# Patient Record
Sex: FEMALE | Race: BLACK OR AFRICAN AMERICAN | Employment: OTHER | URBAN - METROPOLITAN AREA
[De-identification: names, ages, dates, MRNs, and addresses within clinical notes are randomized per-mention and may not be internally consistent; named-entity substitution may affect disease eponyms.]

---

## 2017-06-01 ENCOUNTER — APPOINTMENT (OUTPATIENT)
Dept: GENERAL RADIOLOGY | Facility: HOSPITAL | Age: 47
End: 2017-06-01
Attending: EMERGENCY MEDICINE
Payer: MEDICAID

## 2017-06-01 ENCOUNTER — HOSPITAL ENCOUNTER (EMERGENCY)
Facility: HOSPITAL | Age: 47
Discharge: HOME | End: 2017-06-01
Attending: EMERGENCY MEDICINE
Payer: MEDICAID

## 2017-06-01 VITALS
HEIGHT: 65 IN | OXYGEN SATURATION: 97 % | HEART RATE: 74 BPM | DIASTOLIC BLOOD PRESSURE: 76 MMHG | BODY MASS INDEX: 29.66 KG/M2 | WEIGHT: 178 LBS | RESPIRATION RATE: 14 BRPM | SYSTOLIC BLOOD PRESSURE: 112 MMHG

## 2017-06-01 DIAGNOSIS — S33.5XXA LOW BACK SPRAIN, INITIAL ENCOUNTER: Primary | ICD-10-CM

## 2017-06-01 PROCEDURE — 72110 X-RAY EXAM L-2 SPINE 4/>VWS: CPT | Performed by: EMERGENCY MEDICINE

## 2017-06-01 PROCEDURE — 96372 THER/PROPH/DIAG INJ SC/IM: CPT

## 2017-06-01 PROCEDURE — 99283 EMERGENCY DEPT VISIT LOW MDM: CPT

## 2017-06-01 PROCEDURE — 72072 X-RAY EXAM THORAC SPINE 3VWS: CPT | Performed by: EMERGENCY MEDICINE

## 2017-06-01 RX ORDER — KETOROLAC TROMETHAMINE 30 MG/ML
60 INJECTION, SOLUTION INTRAMUSCULAR; INTRAVENOUS ONCE
Status: COMPLETED | OUTPATIENT
Start: 2017-06-01 | End: 2017-06-01

## 2017-06-01 RX ORDER — CYCLOBENZAPRINE HCL 10 MG
10 TABLET ORAL 3 TIMES DAILY PRN
Qty: 20 TABLET | Refills: 0 | Status: SHIPPED | OUTPATIENT
Start: 2017-06-01 | End: 2017-06-08

## 2017-06-01 NOTE — ED INITIAL ASSESSMENT (HPI)
Pt c/o lower back pain and bilateral shoulder blades, pt sts she slipped on stairs fell to buttock and twisted to L side, pt denies LOC or numbness/tingling to arms/legs.

## 2017-06-01 NOTE — ED PROVIDER NOTES
Patient Seen in: [facility] Emergency Department    History   Patient presents with:  Trauma (cardiovascular, musculoskeletal)    Stated Complaint: slipped and fell down stairs injuried back    HPI    41-year-old woman presents status post fall down 05/03/2017 (Approximate)        Physical Exam   Constitutional: She is oriented to person, place, and time. She appears well-developed and well-nourished. Non-toxic appearance. No distress. HENT:   Head: Normocephalic and atraumatic.    Eyes: Conjunctiva (fge=[id number])    6/1/2017  PROCEDURE:  XR ROUTINE THORACIC SPINE (3 VIEWS) (CPT=72072)  TECHNIQUE:  AP, lateral, and swimmer's views of the thoracic spine were obtained. COMPARISON:  None.   INDICATIONS:  slipped and fell down stairs injuried back  PATIENT S Patient felt comfortable going home.         Disposition and Plan     Clinical Impression:  Low back sprain, initial encounter  (primary encounter diagnosis)    Disposition:  Discharge    Follow-up:  Nonstaff, Physician  [id number]

## 2018-07-09 ENCOUNTER — APPOINTMENT (OUTPATIENT)
Dept: ULTRASOUND IMAGING | Facility: HOSPITAL | Age: 48
End: 2018-07-09
Attending: EMERGENCY MEDICINE
Payer: MEDICAID

## 2018-07-09 ENCOUNTER — HOSPITAL ENCOUNTER (EMERGENCY)
Facility: HOSPITAL | Age: 48
Discharge: HOME | End: 2018-07-09
Attending: EMERGENCY MEDICINE
Payer: MEDICAID

## 2018-07-09 VITALS
WEIGHT: 175 LBS | BODY MASS INDEX: 29.16 KG/M2 | HEART RATE: 62 BPM | SYSTOLIC BLOOD PRESSURE: 99 MMHG | DIASTOLIC BLOOD PRESSURE: 67 MMHG | RESPIRATION RATE: 16 BRPM | OXYGEN SATURATION: 98 % | HEIGHT: 65 IN | TEMPERATURE: 98 F

## 2018-07-09 DIAGNOSIS — R60.9 PERIPHERAL EDEMA: Primary | ICD-10-CM

## 2018-07-09 PROCEDURE — 99284 EMERGENCY DEPT VISIT MOD MDM: CPT

## 2018-07-09 PROCEDURE — 93970 EXTREMITY STUDY: CPT | Performed by: EMERGENCY MEDICINE

## 2018-07-10 NOTE — ED PROVIDER NOTES
Patient Seen in: [facility] Emergency Department    History   Patient presents with:  Swelling Edema (cardiovascular, metabolic)    Stated Complaint: bilateral ankle swelling    HPI    Patient is a 39-year-old woman here with bilateral lower extremi She exhibits no tenderness. 1+ pitting ankle edema bilateral.   Neurological: She is alert and oriented to person, place, and time. She exhibits normal muscle tone. Coordination normal.   Skin: Skin is warm and dry. No rash noted.    Psychiatric: She has Impression:  Peripheral edema  (primary encounter diagnosis)    Disposition:  Discharge  7/9/2018 11:05 pm    Follow-up:  Jean Pierre Perla MD  [address] [place] [address] [phone]    In 2 days  Return to the ER if you feel worse

## 2018-07-30 ENCOUNTER — OFFICE VISIT (OUTPATIENT)
Dept: FAMILY MEDICINE CLINIC | Facility: CLINIC | Age: 48
End: 2018-07-30
Payer: MEDICAID

## 2018-07-30 VITALS
SYSTOLIC BLOOD PRESSURE: 110 MMHG | RESPIRATION RATE: 18 BRPM | DIASTOLIC BLOOD PRESSURE: 72 MMHG | BODY MASS INDEX: 33.01 KG/M2 | OXYGEN SATURATION: 98 % | HEIGHT: 63.75 IN | WEIGHT: 191 LBS | HEART RATE: 72 BPM

## 2018-07-30 DIAGNOSIS — R60.0 BILATERAL LEG EDEMA: Primary | ICD-10-CM

## 2018-07-30 DIAGNOSIS — Z00.00 ROUTINE GENERAL MEDICAL EXAMINATION AT A HEALTH CARE FACILITY: ICD-10-CM

## 2018-07-30 DIAGNOSIS — R20.0 ANTERIOR THIGH NUMBNESS: ICD-10-CM

## 2018-07-30 DIAGNOSIS — E66.9 CLASS 1 OBESITY WITHOUT SERIOUS COMORBIDITY WITH BODY MASS INDEX (BMI) OF 33.0 TO 33.9 IN ADULT, UNSPECIFIED OBESITY TYPE: ICD-10-CM

## 2018-07-30 PROBLEM — K21.9 GASTROESOPHAGEAL REFLUX DISEASE WITHOUT ESOPHAGITIS: Status: ACTIVE | Noted: 2018-07-30

## 2018-07-30 PROBLEM — E66.811 CLASS 1 OBESITY WITHOUT SERIOUS COMORBIDITY WITH BODY MASS INDEX (BMI) OF 33.0 TO 33.9 IN ADULT: Status: ACTIVE | Noted: 2018-07-30

## 2018-07-30 PROCEDURE — 99203 OFFICE O/P NEW LOW 30 MIN: CPT | Performed by: FAMILY MEDICINE

## 2018-07-30 RX ORDER — RANITIDINE 150 MG/1
1 CAPSULE ORAL DAILY
COMMUNITY
Start: 2016-08-23 | End: 2018-08-22

## 2018-07-30 NOTE — PROGRESS NOTES
HPI: 49year old female presents for 3 week ER f/u on B/L leg swelling (NP). VSS. Leg US's were negative B/L. Leg elevation has been helping since ER visit. Denies leg pain or injury. Denies CP/SOB/cough/dizziness. Denies urinary s/s. Denies sleep apnea.  Silvestre Meth rebound/rigidity. Musc: No joint erythema or tenderness. Normal muscular development. Normal gait. Extremities: Extremities normal, atraumatic, no cyanosis or edema. SLR neg B/L. DP's 2+ B/L  Neuro: Normal strength, sensation and reflexes of B/L LE's.   Nikki Lax

## 2018-07-31 NOTE — PATIENT INSTRUCTIONS
PLAN:  -labs/urine testing  -ECHO  -weight loss encouraged  -low salt diet  -compression stockings if needed    -loose fitting clothing to L thigh  -weight loss as discussed  -OTC Ibuprofen/Tylenol as needed  -consider EMG vs L-spine imaging if persists/wo

## 2018-08-01 ENCOUNTER — LAB ENCOUNTER (OUTPATIENT)
Dept: LAB | Facility: HOSPITAL | Age: 48
End: 2018-08-01
Attending: FAMILY MEDICINE
Payer: MEDICAID

## 2018-08-01 DIAGNOSIS — Z00.00 ROUTINE GENERAL MEDICAL EXAMINATION AT A HEALTH CARE FACILITY: ICD-10-CM

## 2018-08-01 PROBLEM — D64.9 ANEMIA: Status: ACTIVE | Noted: 2018-08-01

## 2018-08-01 PROBLEM — D50.9 IRON DEFICIENCY ANEMIA: Status: ACTIVE | Noted: 2018-08-01

## 2018-08-01 PROBLEM — D64.9 ANEMIA: Status: RESOLVED | Noted: 2018-08-01 | Resolved: 2018-08-01

## 2018-08-01 LAB
ALBUMIN SERPL-MCNC: 3.6 G/DL (ref 3.5–4.8)
ALBUMIN/GLOB SERPL: 1 {RATIO} (ref 1–2)
ALP LIVER SERPL-CCNC: 62 U/L (ref 39–100)
ALT SERPL-CCNC: 13 U/L (ref 14–54)
ANION GAP SERPL CALC-SCNC: 9 MMOL/L (ref 0–18)
AST SERPL-CCNC: 11 U/L (ref 15–41)
BASOPHILS # BLD AUTO: 0.02 X10(3) UL (ref 0–0.1)
BASOPHILS NFR BLD AUTO: 0.5 %
BILIRUB SERPL-MCNC: 0.9 MG/DL (ref 0.1–2)
BILIRUB UR QL STRIP.AUTO: NEGATIVE
BUN BLD-MCNC: 12 MG/DL (ref 8–20)
BUN/CREAT SERPL: 12.8 (ref 10–20)
CALCIUM BLD-MCNC: 8.7 MG/DL (ref 8.3–10.3)
CHLORIDE SERPL-SCNC: 107 MMOL/L (ref 101–111)
CHOLEST SMN-MCNC: 190 MG/DL (ref ?–200)
CO2 SERPL-SCNC: 24 MMOL/L (ref 22–32)
COLOR UR AUTO: YELLOW
CREAT BLD-MCNC: 0.94 MG/DL (ref 0.55–1.02)
EOSINOPHIL # BLD AUTO: 0.07 X10(3) UL (ref 0–0.3)
EOSINOPHIL NFR BLD AUTO: 1.7 %
ERYTHROCYTE [DISTWIDTH] IN BLOOD BY AUTOMATED COUNT: 14.6 % (ref 11.5–16)
GLOBULIN PLAS-MCNC: 3.7 G/DL (ref 2.5–3.7)
GLUCOSE BLD-MCNC: 85 MG/DL (ref 70–99)
GLUCOSE UR STRIP.AUTO-MCNC: NEGATIVE MG/DL
HCT VFR BLD AUTO: 36.7 % (ref 34–50)
HDLC SERPL-MCNC: 77 MG/DL (ref 40–59)
HGB BLD-MCNC: 11.4 G/DL (ref 12–16)
IMMATURE GRANULOCYTE COUNT: 0 X10(3) UL (ref 0–1)
IMMATURE GRANULOCYTE RATIO %: 0 %
KETONES UR STRIP.AUTO-MCNC: NEGATIVE MG/DL
LDLC SERPL CALC-MCNC: 102 MG/DL (ref ?–100)
LEUKOCYTE ESTERASE UR QL STRIP.AUTO: NEGATIVE
LYMPHOCYTES # BLD AUTO: 1.75 X10(3) UL (ref 0.9–4)
LYMPHOCYTES NFR BLD AUTO: 43 %
M PROTEIN MFR SERPL ELPH: 7.3 G/DL (ref 6.1–8.3)
MCH RBC QN AUTO: 24.7 PG (ref 27–33.2)
MCHC RBC AUTO-ENTMCNC: 31.1 G/DL (ref 31–37)
MCV RBC AUTO: 79.6 FL (ref 81–100)
MONOCYTES # BLD AUTO: 0.39 X10(3) UL (ref 0.1–1)
MONOCYTES NFR BLD AUTO: 9.6 %
NEUTROPHIL ABS PRELIM: 1.84 X10 (3) UL (ref 1.3–6.7)
NEUTROPHILS # BLD AUTO: 1.84 X10(3) UL (ref 1.3–6.7)
NEUTROPHILS NFR BLD AUTO: 45.2 %
NITRITE UR QL STRIP.AUTO: NEGATIVE
NONHDLC SERPL-MCNC: 113 MG/DL (ref ?–130)
OSMOLALITY SERPL CALC.SUM OF ELEC: 289 MOSM/KG (ref 275–295)
PH UR STRIP.AUTO: 5 [PH] (ref 4.5–8)
PLATELET # BLD AUTO: 249 10(3)UL (ref 150–450)
POTASSIUM SERPL-SCNC: 3.8 MMOL/L (ref 3.6–5.1)
PROT UR STRIP.AUTO-MCNC: NEGATIVE MG/DL
RBC # BLD AUTO: 4.61 X10(6)UL (ref 3.8–5.1)
RBC UR QL AUTO: NEGATIVE
RED CELL DISTRIBUTION WIDTH-SD: 42.1 FL (ref 35.1–46.3)
SODIUM SERPL-SCNC: 140 MMOL/L (ref 136–144)
SP GR UR STRIP.AUTO: 1.02 (ref 1–1.03)
TRIGL SERPL-MCNC: 57 MG/DL (ref 30–149)
TSI SER-ACNC: 1.52 MIU/ML (ref 0.35–5.5)
UROBILINOGEN UR STRIP.AUTO-MCNC: 2 MG/DL
VLDLC SERPL CALC-MCNC: 11 MG/DL (ref 0–30)
WBC # BLD AUTO: 4.1 X10(3) UL (ref 4–13)

## 2018-08-01 PROCEDURE — 81003 URINALYSIS AUTO W/O SCOPE: CPT

## 2018-08-01 PROCEDURE — 36415 COLL VENOUS BLD VENIPUNCTURE: CPT

## 2018-08-01 PROCEDURE — 80053 COMPREHEN METABOLIC PANEL: CPT

## 2018-08-01 PROCEDURE — 82728 ASSAY OF FERRITIN: CPT | Performed by: FAMILY MEDICINE

## 2018-08-01 PROCEDURE — 85025 COMPLETE CBC W/AUTO DIFF WBC: CPT

## 2018-08-01 PROCEDURE — 83550 IRON BINDING TEST: CPT | Performed by: FAMILY MEDICINE

## 2018-08-01 PROCEDURE — 83540 ASSAY OF IRON: CPT | Performed by: FAMILY MEDICINE

## 2018-08-01 PROCEDURE — 82607 VITAMIN B-12: CPT | Performed by: FAMILY MEDICINE

## 2018-08-01 PROCEDURE — 84443 ASSAY THYROID STIM HORMONE: CPT

## 2018-08-01 PROCEDURE — 80061 LIPID PANEL: CPT

## 2018-08-01 PROCEDURE — 82746 ASSAY OF FOLIC ACID SERUM: CPT | Performed by: FAMILY MEDICINE

## 2018-08-02 ENCOUNTER — TELEPHONE (OUTPATIENT)
Dept: FAMILY MEDICINE CLINIC | Facility: CLINIC | Age: 48
End: 2018-08-02

## 2018-08-02 NOTE — TELEPHONE ENCOUNTER
----- Message from Greg Naik DO sent at 8/1/2018  3:24 PM CDT -----  Nml CMP/TSH/UA/folic acid/vit N25    Lipids: Nml trig; great HDL chol!; LDL chol minimally high-rec HHD/DASH diet for LDL chol lowering. Monitor lipids annually.     CBC: mild ane

## 2018-08-02 NOTE — TELEPHONE ENCOUNTER
Spoke to pt regarding lab results; pt informed of results and recommendations; will begin Ferrous Sulfate 325mg daily and an iron rich supplement. Pt instructed to recheck CBC and Ferritin in 6 mo. Pt verbalizes understanding.

## 2018-08-02 NOTE — TELEPHONE ENCOUNTER
----- Message from Lien Virk DO sent at 8/1/2018  3:25 PM CDT -----  Nml CMP/TSH/UA/folic acid/vit Y79    Lipids: Nml trig; great HDL chol!; LDL chol minimally high-rec HHD/DASH diet for LDL chol lowering. Monitor lipids annually.     CBC: mild ane

## 2018-08-18 ENCOUNTER — HOSPITAL ENCOUNTER (OUTPATIENT)
Dept: CV DIAGNOSTICS | Facility: HOSPITAL | Age: 48
Discharge: HOME | End: 2018-08-18
Attending: FAMILY MEDICINE
Payer: MEDICAID

## 2018-08-18 DIAGNOSIS — R60.0 BILATERAL LEG EDEMA: ICD-10-CM

## 2018-08-18 PROCEDURE — 93306 TTE W/DOPPLER COMPLETE: CPT | Performed by: FAMILY MEDICINE

## 2018-08-21 ENCOUNTER — TELEPHONE (OUTPATIENT)
Dept: FAMILY MEDICINE CLINIC | Facility: CLINIC | Age: 48
End: 2018-08-21

## 2018-08-21 NOTE — TELEPHONE ENCOUNTER
----- Message from Toya Chung DO sent at 8/20/2018 12:42 PM CDT -----  ECHO: Mild mitral valve regurgitation otherwise nml study. Unlikely contributor to leg edema.      Continue conservative measures as discussed at last OV 07/30 and f/u in the off

## 2018-08-21 NOTE — TELEPHONE ENCOUNTER
Spoke to pt; informed her of results and recommendations per Dr. Lynda Barry; pt expressed concern with the \"mild mitral valve regurgitation\", pt reassured and asked if she would like to speak with Dr. Lynda Barry; pt states she has an appt with Dr. Lynda Barry o

## 2018-08-22 PROBLEM — F32.A DEPRESSION: Status: ACTIVE | Noted: 2018-08-22

## 2018-08-22 PROBLEM — F41.9 ANXIETY: Status: ACTIVE | Noted: 2018-08-22

## 2018-08-22 NOTE — PATIENT INSTRUCTIONS
PLAN:  -start Effexor 37.5mg daily x 1 week, then increase to 75mg daily  -Ambien nightly as needed    -I recommend Counseling (see below):   [facility] SPECIALTY [facility]  [address] [facility]  [phone]    [facility]

## 2018-08-22 NOTE — PROGRESS NOTES
HPI: 49year old female presents c/o anxiety, depression x several months, worsening the past 1.5 months. VSS. 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 (+) assoc restlessness, fatigue, irritability, and difficulty concentrating.  Google auscultation bilaterally. Heart: Regular rate and rhythm, S1, S2 normal, no murmur, click, rub, or gallop. Musc: No joint erythema or tenderness. Normal muscular development. Normal gait.   Extremities: Extremities normal, atraumatic, no cyanosis or marilynn

## 2018-08-23 ENCOUNTER — TELEPHONE (OUTPATIENT)
Dept: FAMILY MEDICINE CLINIC | Facility: CLINIC | Age: 48
End: 2018-08-23

## 2018-08-23 RX ORDER — VENLAFAXINE HYDROCHLORIDE 37.5 MG/1
37.5 CAPSULE, EXTENDED RELEASE ORAL DAILY
Qty: 7 CAPSULE | Refills: 0 | Status: SHIPPED | OUTPATIENT
Start: 2018-08-23 | End: 2018-08-30

## 2018-08-23 RX ORDER — VENLAFAXINE HYDROCHLORIDE 75 MG/1
75 CAPSULE, EXTENDED RELEASE ORAL DAILY
Qty: 30 CAPSULE | Refills: 0 | Status: SHIPPED | OUTPATIENT
Start: 2018-08-23 | End: 2019-04-01

## 2018-08-23 RX ORDER — VENLAFAXINE 75 MG/1
75 TABLET ORAL DAILY
Qty: 60 TABLET | Refills: 0 | Status: CANCELLED | OUTPATIENT
Start: 2018-08-23

## 2018-08-23 NOTE — TELEPHONE ENCOUNTER
Incoming fax from [employer] with notification of denial of coverage for rx Venlafaxine ER 37.5 mg. Insurance pays max 1 cap qd. Pharmacy requesting new rx for 75 mg caps 1 qd. Rx pended - please advise.

## 2019-04-01 ENCOUNTER — HOSPITAL ENCOUNTER (EMERGENCY)
Facility: HOSPITAL | Age: 49
Discharge: HOME | End: 2019-04-01
Payer: COMMERCIAL

## 2019-04-01 VITALS
SYSTOLIC BLOOD PRESSURE: 121 MMHG | WEIGHT: 170 LBS | BODY MASS INDEX: 28.32 KG/M2 | OXYGEN SATURATION: 98 % | TEMPERATURE: 98 F | HEIGHT: 65 IN | DIASTOLIC BLOOD PRESSURE: 76 MMHG | RESPIRATION RATE: 16 BRPM | HEART RATE: 69 BPM

## 2019-04-01 DIAGNOSIS — J04.0 ACUTE LARYNGITIS: Primary | ICD-10-CM

## 2019-04-01 PROCEDURE — 99283 EMERGENCY DEPT VISIT LOW MDM: CPT

## 2019-04-01 RX ORDER — CETIRIZINE HYDROCHLORIDE 10 MG/1
10 TABLET ORAL DAILY
Qty: 30 TABLET | Refills: 0 | Status: SHIPPED | OUTPATIENT
Start: 2019-04-01 | End: 2019-05-01

## 2019-04-01 NOTE — ED PROVIDER NOTES
Patient Seen in: [facility] Emergency Department    History   Patient presents with:  Sore Throat    Stated Complaint: hoarse voice    HPI    CHIEF COMPLAINT: Hoarse voice    HISTORY OF PRESENT ILLNESS: Patient is a 50-year-old female presents to  Physical Exam    Vital signs and nursing notes reviewed    General Appearance: alert and oriented x 4, no acute distress  Eyes:  pupils equal and round no pallor or injection  Throat: There is mild erythema w/o exudates, no tonsillar hypertrophy.  n pm    Follow-up:  Jones Gayle, DO  [address]  [place] [address]  [phone]    Schedule an appointment as soon as possible for a visit in 1 week  For reevaluation        Medications Prescribed:  Current Discharge Medication List

## 2019-04-01 NOTE — ED INITIAL ASSESSMENT (HPI)
Pt to ED with cough starting on Thursday and having hoarse voice since Saturday. Pt states her throat really isn't sore she is more concerned about the hoarse sound of her voice.

## 2019-05-23 ENCOUNTER — OFFICE VISIT (OUTPATIENT)
Dept: FAMILY MEDICINE CLINIC | Facility: CLINIC | Age: 49
End: 2019-05-23

## 2019-05-23 VITALS
BODY MASS INDEX: 29.82 KG/M2 | TEMPERATURE: 98 F | WEIGHT: 179 LBS | RESPIRATION RATE: 17 BRPM | DIASTOLIC BLOOD PRESSURE: 72 MMHG | HEIGHT: 65 IN | OXYGEN SATURATION: 98 % | SYSTOLIC BLOOD PRESSURE: 100 MMHG | HEART RATE: 62 BPM

## 2019-05-23 DIAGNOSIS — Z12.31 BREAST CANCER SCREENING BY MAMMOGRAM: ICD-10-CM

## 2019-05-23 DIAGNOSIS — Z72.51 HIGH RISK SEXUAL BEHAVIOR, UNSPECIFIED TYPE: ICD-10-CM

## 2019-05-23 DIAGNOSIS — R92.2 DENSE BREAST TISSUE: ICD-10-CM

## 2019-05-23 DIAGNOSIS — Z12.11 COLON CANCER SCREENING: ICD-10-CM

## 2019-05-23 DIAGNOSIS — N91.2 AMENORRHEA: ICD-10-CM

## 2019-05-23 DIAGNOSIS — D50.9 IRON DEFICIENCY ANEMIA, UNSPECIFIED IRON DEFICIENCY ANEMIA TYPE: ICD-10-CM

## 2019-05-23 DIAGNOSIS — Z00.00 ROUTINE GENERAL MEDICAL EXAMINATION AT A HEALTH CARE FACILITY: Primary | ICD-10-CM

## 2019-05-23 DIAGNOSIS — N63.0 BREAST MASS: ICD-10-CM

## 2019-05-23 DIAGNOSIS — Z12.4 CERVICAL CANCER SCREENING: ICD-10-CM

## 2019-05-23 DIAGNOSIS — G47.00 INSOMNIA, UNSPECIFIED TYPE: ICD-10-CM

## 2019-05-23 PROBLEM — R92.30 DENSE BREAST TISSUE: Status: ACTIVE | Noted: 2019-05-23

## 2019-05-23 PROCEDURE — 87624 HPV HI-RISK TYP POOLED RSLT: CPT | Performed by: FAMILY MEDICINE

## 2019-05-23 PROCEDURE — 81025 URINE PREGNANCY TEST: CPT | Performed by: FAMILY MEDICINE

## 2019-05-23 PROCEDURE — 87491 CHLMYD TRACH DNA AMP PROBE: CPT | Performed by: FAMILY MEDICINE

## 2019-05-23 PROCEDURE — 88175 CYTOPATH C/V AUTO FLUID REDO: CPT | Performed by: FAMILY MEDICINE

## 2019-05-23 PROCEDURE — 87591 N.GONORRHOEAE DNA AMP PROB: CPT | Performed by: FAMILY MEDICINE

## 2019-05-23 PROCEDURE — 99396 PREV VISIT EST AGE 40-64: CPT | Performed by: FAMILY MEDICINE

## 2019-05-23 NOTE — PROGRESS NOTES
HPI: 51year old female presents for a general physical & pap smear. VSS. Denies fevers/chills, C.P., palpitations, dizziness, SOB, cough, abd pain, N/V/D, fatigue. No recent illness. Nml urine without dysuria or hematuria. Nml BM's. Weight is stable.  LMP cooperative, no distress, appears stated age. Overweight  Psych: Nml affect. Nml ADLs. Able to manage affairs. Head: Normocephalic, without obvious abnormality, atraumatic. Eyes: Conjunctivae/corneas clear. PERRL, EOMs intact.    Ears: Normal TMs and exte Anterior thigh numbness     Iron deficiency anemia     Depression     Anxiety     Postpartum care and examination immediately after delivery     Migraine     Major depressive disorder, recurrent episode, moderate (HCC)     History of migraine during pregna

## 2019-05-23 NOTE — PATIENT INSTRUCTIONS
PLAN:  -urine pregnancy negative  -fasting bloodwork with STD testing  -pap smear obtained  -3D mammogram/R breast US ordered  -colonoscopy at age 47 (see GI referral)  -stay well-hydrated  -regular exercise: goal 5x/week of moderate-intensity exercise

## 2019-05-25 ENCOUNTER — LAB ENCOUNTER (OUTPATIENT)
Dept: LAB | Facility: HOSPITAL | Age: 49
End: 2019-05-25
Attending: FAMILY MEDICINE
Payer: COMMERCIAL

## 2019-05-25 DIAGNOSIS — D50.9 IRON DEFICIENCY ANEMIA, UNSPECIFIED IRON DEFICIENCY ANEMIA TYPE: ICD-10-CM

## 2019-05-25 DIAGNOSIS — N91.2 AMENORRHEA: ICD-10-CM

## 2019-05-25 DIAGNOSIS — Z00.00 ROUTINE GENERAL MEDICAL EXAMINATION AT A HEALTH CARE FACILITY: ICD-10-CM

## 2019-05-25 DIAGNOSIS — Z72.51 HIGH RISK SEXUAL BEHAVIOR, UNSPECIFIED TYPE: ICD-10-CM

## 2019-05-25 PROCEDURE — 84443 ASSAY THYROID STIM HORMONE: CPT

## 2019-05-25 PROCEDURE — 87389 HIV-1 AG W/HIV-1&-2 AB AG IA: CPT

## 2019-05-25 PROCEDURE — 82728 ASSAY OF FERRITIN: CPT

## 2019-05-25 PROCEDURE — 86780 TREPONEMA PALLIDUM: CPT

## 2019-05-25 PROCEDURE — 80053 COMPREHEN METABOLIC PANEL: CPT

## 2019-05-25 PROCEDURE — 83001 ASSAY OF GONADOTROPIN (FSH): CPT

## 2019-05-25 PROCEDURE — 82670 ASSAY OF TOTAL ESTRADIOL: CPT

## 2019-05-25 PROCEDURE — 87340 HEPATITIS B SURFACE AG IA: CPT

## 2019-05-25 PROCEDURE — 85025 COMPLETE CBC W/AUTO DIFF WBC: CPT

## 2019-05-25 PROCEDURE — 80061 LIPID PANEL: CPT

## 2019-05-25 PROCEDURE — 36415 COLL VENOUS BLD VENIPUNCTURE: CPT

## 2019-06-03 ENCOUNTER — TELEPHONE (OUTPATIENT)
Dept: FAMILY MEDICINE CLINIC | Facility: CLINIC | Age: 49
End: 2019-06-03

## 2019-06-03 DIAGNOSIS — E61.1 IRON DEFICIENCY: Primary | ICD-10-CM

## 2019-06-03 NOTE — TELEPHONE ENCOUNTER
----- Message from Tiffany Rowley DO sent at 5/31/2019  8:30 AM CDT -----  STD testing (HIV/RPR/Hep B) negative. Nml CBC/CMP/TSH. Hormones not in menopausal range as of yet. Likely perimenopausal.    Lipids: minimally elev LDL chol.  Great HDL ch

## 2019-06-03 NOTE — TELEPHONE ENCOUNTER
----- Message from Edel Hensley DO sent at 5/31/2019  8:33 AM CDT -----  Pap smear nml, HPV negative. Rec routine pap smear in 3 years. Was Romulo/Chl not run?  If not, ask patient if she would still like this run and we can have her give us a urine s

## 2019-06-03 NOTE — TELEPHONE ENCOUNTER
Pt returned call, informed her of lab results and recommendations per Dr. Omar Franklin. Pt informed will call with Romulo/Chl results when available. Pt states she is not taking OTC iron supplement.  Pt asking if labs indicate she is close to menopause or just sta

## 2019-06-03 NOTE — TELEPHONE ENCOUNTER
LMTCB  FYI Romulo/chl added; will be ran and resulted by [facility] lab.

## 2019-06-04 RX ORDER — FERROUS SULFATE 325(65) MG
325 TABLET ORAL
Qty: 90 TABLET | Refills: 3 | COMMUNITY
Start: 2019-06-04 | End: 2021-11-08

## 2019-06-04 NOTE — TELEPHONE ENCOUNTER
Romulo/Chl NEGATIVE    Labs unable to indicate exact timing of menopause onset. Rec OTC Ferrous Sulfate 325mg once daily with an iron-rich diet. Recheck CBC/ferritin in 3-6 months.

## 2019-06-06 NOTE — TELEPHONE ENCOUNTER
Spoke with patient, advised Romulo/Chl negative & unfortunately labs unable to indicate exact timing of menopause onset. Recommended otc iron Ferrous Sulfate 325 mg daily and iron rich diet & rpt blood test CBC in 3-6 mo.   Patient verbalizes understanding of

## 2019-12-28 ENCOUNTER — HOSPITAL ENCOUNTER (OUTPATIENT)
Dept: MAMMOGRAPHY | Facility: HOSPITAL | Age: 49
Discharge: HOME | End: 2019-12-28
Attending: FAMILY MEDICINE
Payer: COMMERCIAL

## 2019-12-28 DIAGNOSIS — Z12.31 BREAST CANCER SCREENING BY MAMMOGRAM: ICD-10-CM

## 2019-12-28 DIAGNOSIS — R92.2 DENSE BREAST TISSUE: ICD-10-CM

## 2019-12-28 PROCEDURE — 77063 BREAST TOMOSYNTHESIS BI: CPT | Performed by: FAMILY MEDICINE

## 2019-12-28 PROCEDURE — 77067 SCR MAMMO BI INCL CAD: CPT | Performed by: FAMILY MEDICINE

## 2020-04-04 ENCOUNTER — HOSPITAL ENCOUNTER (EMERGENCY)
Facility: HOSPITAL | Age: 50
Discharge: HOME | End: 2020-04-04
Attending: EMERGENCY MEDICINE
Payer: COMMERCIAL

## 2020-04-04 ENCOUNTER — APPOINTMENT (OUTPATIENT)
Dept: GENERAL RADIOLOGY | Facility: HOSPITAL | Age: 50
End: 2020-04-04
Attending: EMERGENCY MEDICINE
Payer: COMMERCIAL

## 2020-04-04 VITALS
WEIGHT: 180 LBS | BODY MASS INDEX: 29.99 KG/M2 | HEART RATE: 64 BPM | HEIGHT: 65 IN | TEMPERATURE: 99 F | DIASTOLIC BLOOD PRESSURE: 83 MMHG | RESPIRATION RATE: 15 BRPM | OXYGEN SATURATION: 97 % | SYSTOLIC BLOOD PRESSURE: 121 MMHG

## 2020-04-04 DIAGNOSIS — Z20.822 SUSPECTED WUHAN CORONAVIRUS INFECTION: Primary | ICD-10-CM

## 2020-04-04 PROCEDURE — 71045 X-RAY EXAM CHEST 1 VIEW: CPT | Performed by: EMERGENCY MEDICINE

## 2020-04-04 PROCEDURE — 93010 ELECTROCARDIOGRAM REPORT: CPT

## 2020-04-04 PROCEDURE — 99285 EMERGENCY DEPT VISIT HI MDM: CPT

## 2020-04-04 PROCEDURE — 99284 EMERGENCY DEPT VISIT MOD MDM: CPT

## 2020-04-04 PROCEDURE — 93005 ELECTROCARDIOGRAM TRACING: CPT

## 2020-04-04 PROCEDURE — 87999 UNLISTED MICROBIOLOGY PX: CPT

## 2020-04-04 NOTE — ED NOTES
Pt aox4. Rn discussed dc, quarantine, and return to ed with worsening s/s such as OLIVIA with exertion with patient. Pt verbalized understanding of dc instructions. Pt ambulated to ed exit with steady gait.

## 2020-04-04 NOTE — ED PROVIDER NOTES
Patient Seen in: [facility] Emergency Department      History   Patient presents with:  Dyspnea OLIVIA SOB  Headache    Stated Complaint: shortness of breath, chest tightness, sinus headache, cough    HPI    Patient presents with vague chest heaviness respiratory distress. Breath sounds: Normal breath sounds. No decreased breath sounds or wheezing. Abdominal:      General: Bowel sounds are normal.      Palpations: Abdomen is soft. Tenderness: There is no tenderness. There is no rebound.    Mu

## 2020-04-04 NOTE — ED INITIAL ASSESSMENT (HPI)
Pt aox4. Pt presents to ed from home. Pt c/o headache around eyes, night sweats, and chest heaviness x 2 days. Pt states has not exposed to COVID. Pt states has had night sweats due to menopause.  Pt c/o dry cough that is intermittent but polo for the george

## 2021-03-30 ENCOUNTER — HOSPITAL ENCOUNTER (EMERGENCY)
Facility: HOSPITAL | Age: 51
Discharge: HOME | End: 2021-03-30
Attending: EMERGENCY MEDICINE
Payer: COMMERCIAL

## 2021-03-30 VITALS
SYSTOLIC BLOOD PRESSURE: 132 MMHG | WEIGHT: 179.88 LBS | TEMPERATURE: 98 F | OXYGEN SATURATION: 99 % | DIASTOLIC BLOOD PRESSURE: 97 MMHG | HEART RATE: 78 BPM | RESPIRATION RATE: 16 BRPM | BODY MASS INDEX: 30 KG/M2

## 2021-03-30 DIAGNOSIS — H92.01 RIGHT EAR PAIN: Primary | ICD-10-CM

## 2021-03-30 PROCEDURE — 99283 EMERGENCY DEPT VISIT LOW MDM: CPT | Performed by: EMERGENCY MEDICINE

## 2021-03-30 PROCEDURE — 87081 CULTURE SCREEN ONLY: CPT | Performed by: EMERGENCY MEDICINE

## 2021-03-30 PROCEDURE — 87430 STREP A AG IA: CPT | Performed by: EMERGENCY MEDICINE

## 2021-03-30 NOTE — ED INITIAL ASSESSMENT (HPI)
Pt c/o rt ear, denies any drainage or trauma, pt feels that she is getting a ear ache, pt aox4, nad skin warm and intact

## 2021-03-30 NOTE — ED PROVIDER NOTES
Patient Seen in: [facility] Emergency Department      History   Patient presents with:  Ear Problem Pain    Stated Complaint: Ear pain, right ear.  Denies fever    HPI/Subjective:   HPI    This is a 69-year-old female who presents with right ear pain no perforation there is no foreign body there is findings of what seems to be some mild dullness to the right TMs. Submandibular there is some mild nonspecific small lymph nodes. The throat is mildly red there is no peritonsillar abscess. No stridor.   L diagnosis)    Disposition:  Discharge  3/30/2021  7:19 pm    Follow-up:  Felicity Vargas MD  [address] [phone]    In 2 days            Medications Prescribed:  Current Discharge Medication List

## 2021-05-03 ENCOUNTER — HOSPITAL ENCOUNTER (OUTPATIENT)
Age: 51
Discharge: HOME | End: 2021-05-03
Payer: COMMERCIAL

## 2021-05-03 VITALS
HEART RATE: 78 BPM | BODY MASS INDEX: 32.32 KG/M2 | WEIGHT: 194 LBS | SYSTOLIC BLOOD PRESSURE: 106 MMHG | RESPIRATION RATE: 18 BRPM | DIASTOLIC BLOOD PRESSURE: 75 MMHG | OXYGEN SATURATION: 97 % | TEMPERATURE: 99 F | HEIGHT: 65 IN

## 2021-05-03 DIAGNOSIS — B35.4 TINEA CORPORIS: Primary | ICD-10-CM

## 2021-05-03 PROCEDURE — 99202 OFFICE O/P NEW SF 15 MIN: CPT | Performed by: PHYSICIAN ASSISTANT

## 2021-05-03 NOTE — ED PROVIDER NOTES
Patient Seen in: [facility] [address] [place] [address] Highway      History   Patient presents with:  Pain  Swollen Glands  Rash Skin Problem    Stated Complaint: pain in left chin area , rash on hip     HPI/Subjective:   HPI    CHIEF COMPLAINT: Rash on the left thigh Temp 99.1 °F (37.3 °C)   Temp src Temporal   SpO2 97 %   O2 Device None (Room air)       Current:/75   Pulse 78   Temp 99.1 °F (37.3 °C) (Temporal)   Resp 18   Ht 165.1 cm (5' 5\")   Wt 88 kg   LMP 11/03/2020   SpO2 97%   BMI 32.28 kg/m²         Ph pm    Follow-up:  Sherman Huntley MD  [address] [phone]    In 1 week  If symptoms worsen          Medications Prescribed:  Discharge Medication List as of 5/3/2021  7:08 PM

## 2021-07-25 ENCOUNTER — HOSPITAL ENCOUNTER (EMERGENCY)
Facility: HOSPITAL | Age: 51
Discharge: HOME | End: 2021-07-25
Attending: EMERGENCY MEDICINE
Payer: COMMERCIAL

## 2021-07-25 ENCOUNTER — APPOINTMENT (OUTPATIENT)
Dept: GENERAL RADIOLOGY | Facility: HOSPITAL | Age: 51
End: 2021-07-25
Attending: EMERGENCY MEDICINE
Payer: COMMERCIAL

## 2021-07-25 VITALS
RESPIRATION RATE: 14 BRPM | BODY MASS INDEX: 29.99 KG/M2 | HEIGHT: 65 IN | TEMPERATURE: 98 F | SYSTOLIC BLOOD PRESSURE: 109 MMHG | HEART RATE: 61 BPM | DIASTOLIC BLOOD PRESSURE: 71 MMHG | OXYGEN SATURATION: 100 % | WEIGHT: 180 LBS

## 2021-07-25 DIAGNOSIS — R07.9 ACUTE CHEST PAIN: Primary | ICD-10-CM

## 2021-07-25 LAB
ALBUMIN SERPL-MCNC: 3.1 G/DL (ref 3.4–5)
ALBUMIN/GLOB SERPL: 0.9 {RATIO} (ref 1–2)
ALP LIVER SERPL-CCNC: 58 U/L
ALT SERPL-CCNC: 10 U/L
ANION GAP SERPL CALC-SCNC: 5 MMOL/L (ref 0–18)
AST SERPL-CCNC: 9 U/L (ref 15–37)
ATRIAL RATE: 54 BPM
ATRIAL RATE: 64 BPM
BASOPHILS # BLD AUTO: 0.02 X10(3) UL (ref 0–0.2)
BASOPHILS NFR BLD AUTO: 0.5 %
BILIRUB SERPL-MCNC: 0.7 MG/DL (ref 0.1–2)
BUN BLD-MCNC: 6 MG/DL (ref 7–18)
BUN/CREAT SERPL: 5.7 (ref 10–20)
CALCIUM BLD-MCNC: 8 MG/DL (ref 8.5–10.1)
CHLORIDE SERPL-SCNC: 107 MMOL/L (ref 98–112)
CO2 SERPL-SCNC: 26 MMOL/L (ref 21–32)
CREAT BLD-MCNC: 1.06 MG/DL
D-DIMER: <0.27 UG/ML FEU (ref ?–0.51)
DEPRECATED RDW RBC AUTO: 43.9 FL (ref 35.1–46.3)
EOSINOPHIL # BLD AUTO: 0.1 X10(3) UL (ref 0–0.7)
EOSINOPHIL NFR BLD AUTO: 2.5 %
ERYTHROCYTE [DISTWIDTH] IN BLOOD BY AUTOMATED COUNT: 15 % (ref 11–15)
GLOBULIN PLAS-MCNC: 3.4 G/DL (ref 2.8–4.4)
GLUCOSE BLD-MCNC: 86 MG/DL (ref 70–99)
HCT VFR BLD AUTO: 36.8 %
HGB BLD-MCNC: 11.7 G/DL
IMM GRANULOCYTES # BLD AUTO: 0.01 X10(3) UL (ref 0–1)
IMM GRANULOCYTES NFR BLD: 0.2 %
LYMPHOCYTES # BLD AUTO: 1.46 X10(3) UL (ref 1–4)
LYMPHOCYTES NFR BLD AUTO: 35.8 %
M PROTEIN MFR SERPL ELPH: 6.5 G/DL (ref 6.4–8.2)
MCH RBC QN AUTO: 25.7 PG (ref 26–34)
MCHC RBC AUTO-ENTMCNC: 31.8 G/DL (ref 31–37)
MCV RBC AUTO: 80.7 FL
MONOCYTES # BLD AUTO: 0.34 X10(3) UL (ref 0.1–1)
MONOCYTES NFR BLD AUTO: 8.3 %
NEUTROPHILS # BLD AUTO: 2.15 X10 (3) UL (ref 1.5–7.7)
NEUTROPHILS # BLD AUTO: 2.15 X10(3) UL (ref 1.5–7.7)
NEUTROPHILS NFR BLD AUTO: 52.7 %
OSMOLALITY SERPL CALC.SUM OF ELEC: 283 MOSM/KG (ref 275–295)
P AXIS: 43 DEGREES
P AXIS: 47 DEGREES
P-R INTERVAL: 166 MS
P-R INTERVAL: 168 MS
PLATELET # BLD AUTO: 246 10(3)UL (ref 150–450)
POTASSIUM SERPL-SCNC: 3.6 MMOL/L (ref 3.5–5.1)
Q-T INTERVAL: 410 MS
Q-T INTERVAL: 436 MS
QRS DURATION: 72 MS
QRS DURATION: 82 MS
QTC CALCULATION (BEZET): 413 MS
QTC CALCULATION (BEZET): 422 MS
R AXIS: 15 DEGREES
R AXIS: 35 DEGREES
RBC # BLD AUTO: 4.56 X10(6)UL
SODIUM SERPL-SCNC: 138 MMOL/L (ref 136–145)
T AXIS: 23 DEGREES
T AXIS: 30 DEGREES
TROPONIN I SERPL-MCNC: <0.045 NG/ML (ref ?–0.04)
TROPONIN I SERPL-MCNC: <0.045 NG/ML (ref ?–0.04)
VENTRICULAR RATE: 54 BPM
VENTRICULAR RATE: 64 BPM
WBC # BLD AUTO: 4.1 X10(3) UL (ref 4–11)

## 2021-07-25 PROCEDURE — 85379 FIBRIN DEGRADATION QUANT: CPT | Performed by: EMERGENCY MEDICINE

## 2021-07-25 PROCEDURE — 85025 COMPLETE CBC W/AUTO DIFF WBC: CPT | Performed by: EMERGENCY MEDICINE

## 2021-07-25 PROCEDURE — 99284 EMERGENCY DEPT VISIT MOD MDM: CPT | Performed by: EMERGENCY MEDICINE

## 2021-07-25 PROCEDURE — 93010 ELECTROCARDIOGRAM REPORT: CPT | Performed by: EMERGENCY MEDICINE

## 2021-07-25 PROCEDURE — 84484 ASSAY OF TROPONIN QUANT: CPT | Performed by: EMERGENCY MEDICINE

## 2021-07-25 PROCEDURE — 36415 COLL VENOUS BLD VENIPUNCTURE: CPT | Performed by: EMERGENCY MEDICINE

## 2021-07-25 PROCEDURE — 71045 X-RAY EXAM CHEST 1 VIEW: CPT | Performed by: EMERGENCY MEDICINE

## 2021-07-25 PROCEDURE — 93005 ELECTROCARDIOGRAM TRACING: CPT

## 2021-07-25 PROCEDURE — 80053 COMPREHEN METABOLIC PANEL: CPT | Performed by: EMERGENCY MEDICINE

## 2021-07-25 RX ORDER — MAGNESIUM HYDROXIDE/ALUMINUM HYDROXICE/SIMETHICONE 120; 1200; 1200 MG/30ML; MG/30ML; MG/30ML
30 SUSPENSION ORAL ONCE
Status: COMPLETED | OUTPATIENT
Start: 2021-07-25 | End: 2021-07-25

## 2021-07-25 RX ORDER — LIDOCAINE HYDROCHLORIDE 20 MG/ML
10 SOLUTION OROPHARYNGEAL ONCE
Status: COMPLETED | OUTPATIENT
Start: 2021-07-25 | End: 2021-07-25

## 2021-07-25 RX ORDER — ASPIRIN 81 MG/1
324 TABLET, CHEWABLE ORAL ONCE
Status: COMPLETED | OUTPATIENT
Start: 2021-07-25 | End: 2021-07-25

## 2021-07-25 NOTE — ED INITIAL ASSESSMENT (HPI)
PT PRESENTS TO ED WITH CHEST PAIN IN THE CENTER OF CHEST THAT FEELS TIGHT, PT ALSO COMPLAINING OF SHORTNESS OF BREATH, PT STATES TIGHTNESS IS INTERMITTENT. PT DENIES TAKING ASPIRIN TODAY. DENIES PAIN THAT RADIATES.

## 2021-07-25 NOTE — ED PROVIDER NOTES
Patient Seen in: [facility] Emergency Department      History   Patient presents with:  Chest Pain Angina    Stated Complaint: chest pain for last 2 days    HPI/Subjective:   HPI    This is a 53-year female female no significant past medical history above.    Physical Exam     ED Triage Vitals   BP 07/25/21 1112 109/78   Pulse 07/25/21 1112 64   Resp 07/25/21 1112 19   Temp 07/25/21 1116 98 °F (36.7 °C)   Temp src 07/25/21 1116 Oral   SpO2 07/25/21 1112 99 %   O2 Device 07/25/21 1112 None (Room air) results for these tests on the individual orders. RAINBOW DRAW GOLD   RAINBOW DRAW LAVENDER   RAINBOW DRAW LIGHT GREEN     EKG    Rate, intervals and axes as noted on EKG Report. Rate:51  Rhythm: Sinus Rhythm  Reading: No acute changes.   Poor R wave pro with an outpatient. Will refer her to on-call primary care as she does not have 1. She is comfortable with this plan. She was discharged home in good condition.     Disposition and Plan     Clinical Impression:  Acute chest pain  (primary encounter diagn

## 2022-02-10 ENCOUNTER — LAB ENCOUNTER (OUTPATIENT)
Dept: LAB | Facility: HOSPITAL | Age: 52
End: 2022-02-10
Attending: INTERNAL MEDICINE
Payer: COMMERCIAL

## 2022-02-10 DIAGNOSIS — Z00.00 ROUTINE GENERAL MEDICAL EXAMINATION AT A HEALTH CARE FACILITY: ICD-10-CM

## 2022-02-10 DIAGNOSIS — E03.9 PRIMARY HYPOTHYROIDISM: ICD-10-CM

## 2022-02-10 DIAGNOSIS — E55.9 VITAMIN D DEFICIENCY: ICD-10-CM

## 2022-02-10 DIAGNOSIS — E78.5 HYPERLIPIDEMIA, UNSPECIFIED HYPERLIPIDEMIA TYPE: ICD-10-CM

## 2022-02-10 DIAGNOSIS — R73.9 BLOOD GLUCOSE ELEVATED: ICD-10-CM

## 2022-02-10 DIAGNOSIS — D64.9 ANEMIA, UNSPECIFIED TYPE: ICD-10-CM

## 2022-02-10 LAB
ALBUMIN SERPL-MCNC: 3.3 G/DL (ref 3.4–5)
ALBUMIN/GLOB SERPL: 0.9 {RATIO} (ref 1–2)
ALP LIVER SERPL-CCNC: 71 U/L
ALT SERPL-CCNC: 12 U/L
ANION GAP SERPL CALC-SCNC: 3 MMOL/L (ref 0–18)
AST SERPL-CCNC: 11 U/L (ref 15–37)
BASOPHILS # BLD AUTO: 0.02 X10(3) UL (ref 0–0.2)
BASOPHILS NFR BLD AUTO: 0.4 %
BILIRUB SERPL-MCNC: 0.7 MG/DL (ref 0.1–2)
CALCIUM BLD-MCNC: 9 MG/DL (ref 8.5–10.1)
CHLORIDE SERPL-SCNC: 110 MMOL/L (ref 98–112)
CHOLEST SERPL-MCNC: 206 MG/DL (ref ?–200)
CO2 SERPL-SCNC: 28 MMOL/L (ref 21–32)
CREAT BLD-MCNC: 1.06 MG/DL
EOSINOPHIL # BLD AUTO: 0.11 X10(3) UL (ref 0–0.7)
EOSINOPHIL NFR BLD AUTO: 2.4 %
ERYTHROCYTE [DISTWIDTH] IN BLOOD BY AUTOMATED COUNT: 14.1 %
EST. AVERAGE GLUCOSE BLD GHB EST-MCNC: 123 MG/DL (ref 68–126)
FASTING PATIENT LIPID ANSWER: NO
FASTING STATUS PATIENT QL REPORTED: NO
GLOBULIN PLAS-MCNC: 3.5 G/DL (ref 2.8–4.4)
GLUCOSE BLD-MCNC: 92 MG/DL (ref 70–99)
HCT VFR BLD AUTO: 35.5 %
HDLC SERPL-MCNC: 79 MG/DL (ref 40–59)
HGB BLD-MCNC: 10.8 G/DL
IMM GRANULOCYTES # BLD AUTO: 0.01 X10(3) UL (ref 0–1)
IMM GRANULOCYTES NFR BLD: 0.2 %
LDLC SERPL CALC-MCNC: 114 MG/DL (ref ?–100)
LYMPHOCYTES NFR BLD AUTO: 36.7 %
MCH RBC QN AUTO: 24.5 PG (ref 26–34)
MCHC RBC AUTO-ENTMCNC: 30.4 G/DL (ref 31–37)
MCV RBC AUTO: 80.5 FL
MONOCYTES # BLD AUTO: 0.3 X10(3) UL (ref 0.1–1)
MONOCYTES NFR BLD AUTO: 6.6 %
NEUTROPHILS # BLD AUTO: 2.46 X10 (3) UL (ref 1.5–7.7)
NEUTROPHILS # BLD AUTO: 2.46 X10(3) UL (ref 1.5–7.7)
NEUTROPHILS NFR BLD AUTO: 53.7 %
NONHDLC SERPL-MCNC: 127 MG/DL (ref ?–130)
OSMOLALITY SERPL CALC.SUM OF ELEC: 290 MOSM/KG (ref 275–295)
PLATELET # BLD AUTO: 250 10(3)UL (ref 150–450)
POTASSIUM SERPL-SCNC: 4.2 MMOL/L (ref 3.5–5.1)
PROT SERPL-MCNC: 6.8 G/DL (ref 6.4–8.2)
RBC # BLD AUTO: 4.41 X10(6)UL
SODIUM SERPL-SCNC: 141 MMOL/L (ref 136–145)
TRIGL SERPL-MCNC: 74 MG/DL (ref 30–149)
TSI SER-ACNC: 1.38 MIU/ML (ref 0.36–3.74)
VIT D+METAB SERPL-MCNC: 12.6 NG/ML (ref 30–100)
VLDLC SERPL CALC-MCNC: 13 MG/DL (ref 0–30)
WBC # BLD AUTO: 4.6 X10(3) UL (ref 4–11)

## 2022-02-10 PROCEDURE — 84443 ASSAY THYROID STIM HORMONE: CPT

## 2022-02-10 PROCEDURE — 36415 COLL VENOUS BLD VENIPUNCTURE: CPT

## 2022-02-10 PROCEDURE — 85025 COMPLETE CBC W/AUTO DIFF WBC: CPT

## 2022-02-10 PROCEDURE — 80053 COMPREHEN METABOLIC PANEL: CPT

## 2022-02-10 PROCEDURE — 80061 LIPID PANEL: CPT

## 2022-02-10 PROCEDURE — 82306 VITAMIN D 25 HYDROXY: CPT

## 2022-02-10 PROCEDURE — 83036 HEMOGLOBIN GLYCOSYLATED A1C: CPT

## 2022-02-15 NOTE — PROGRESS NOTES
Patient informed and verbalized understanding. SENT ERX.     Set reminder to call patient for f/u labs in 1-2 MO

## 2022-02-18 ENCOUNTER — HOSPITAL ENCOUNTER (OUTPATIENT)
Dept: MAMMOGRAPHY | Facility: HOSPITAL | Age: 52
Discharge: HOME | End: 2022-02-18
Attending: INTERNAL MEDICINE
Payer: COMMERCIAL

## 2022-02-18 DIAGNOSIS — Z12.31 VISIT FOR SCREENING MAMMOGRAM: ICD-10-CM

## 2022-02-18 PROCEDURE — 77063 BREAST TOMOSYNTHESIS BI: CPT | Performed by: INTERNAL MEDICINE

## 2022-02-18 PROCEDURE — 77067 SCR MAMMO BI INCL CAD: CPT | Performed by: INTERNAL MEDICINE

## 2022-02-22 ENCOUNTER — LAB ENCOUNTER (OUTPATIENT)
Dept: LAB | Facility: HOSPITAL | Age: 52
End: 2022-02-22
Attending: STUDENT IN AN ORGANIZED HEALTH CARE EDUCATION/TRAINING PROGRAM
Payer: COMMERCIAL

## 2022-02-22 DIAGNOSIS — Z01.818 PRE-OP TESTING: ICD-10-CM

## 2022-02-23 LAB — SARS-COV-2 RNA RESP QL NAA+PROBE: NOT DETECTED

## 2022-02-25 PROBLEM — Z12.11 SPECIAL SCREENING FOR MALIGNANT NEOPLASM OF COLON: Status: ACTIVE | Noted: 2022-02-25

## 2022-04-13 PROBLEM — E66.09 OBESITY DUE TO EXCESS CALORIES: Status: ACTIVE | Noted: 2022-04-13

## 2022-04-13 PROBLEM — E55.9 VITAMIN D DEFICIENCY: Status: ACTIVE | Noted: 2022-04-13

## 2022-07-08 ENCOUNTER — LAB ENCOUNTER (OUTPATIENT)
Dept: LAB | Facility: HOSPITAL | Age: 52
End: 2022-07-08
Attending: INTERNAL MEDICINE
Payer: MEDICAID

## 2022-07-08 DIAGNOSIS — R60.9 EDEMA, UNSPECIFIED TYPE: ICD-10-CM

## 2022-07-08 LAB
ALBUMIN SERPL-MCNC: 3.3 G/DL (ref 3.4–5)
ALBUMIN/GLOB SERPL: 0.9 {RATIO} (ref 1–2)
ALP LIVER SERPL-CCNC: 73 U/L
ALT SERPL-CCNC: 9 U/L
ANION GAP SERPL CALC-SCNC: 5 MMOL/L (ref 0–18)
AST SERPL-CCNC: 9 U/L (ref 15–37)
BASOPHILS # BLD AUTO: 0.02 X10(3) UL (ref 0–0.2)
BASOPHILS NFR BLD AUTO: 0.5 %
BILIRUB SERPL-MCNC: 0.8 MG/DL (ref 0.1–2)
BUN BLD-MCNC: 11 MG/DL (ref 7–18)
CALCIUM BLD-MCNC: 8.9 MG/DL (ref 8.5–10.1)
CHLORIDE SERPL-SCNC: 109 MMOL/L (ref 98–112)
CO2 SERPL-SCNC: 26 MMOL/L (ref 21–32)
CREAT BLD-MCNC: 1.06 MG/DL
EOSINOPHIL # BLD AUTO: 0.1 X10(3) UL (ref 0–0.7)
EOSINOPHIL NFR BLD AUTO: 2.4 %
ERYTHROCYTE [DISTWIDTH] IN BLOOD BY AUTOMATED COUNT: 15.8 %
FASTING STATUS PATIENT QL REPORTED: NO
GLOBULIN PLAS-MCNC: 3.7 G/DL (ref 2.8–4.4)
GLUCOSE BLD-MCNC: 87 MG/DL (ref 70–99)
HCT VFR BLD AUTO: 35.4 %
HGB BLD-MCNC: 11 G/DL
IMM GRANULOCYTES # BLD AUTO: 0 X10(3) UL (ref 0–1)
IMM GRANULOCYTES NFR BLD: 0 %
LYMPHOCYTES # BLD AUTO: 1.67 X10(3) UL (ref 1–4)
LYMPHOCYTES NFR BLD AUTO: 39.4 %
MCH RBC QN AUTO: 24.4 PG (ref 26–34)
MCHC RBC AUTO-ENTMCNC: 31.1 G/DL (ref 31–37)
MCV RBC AUTO: 78.7 FL
MONOCYTES # BLD AUTO: 0.3 X10(3) UL (ref 0.1–1)
MONOCYTES NFR BLD AUTO: 7.1 %
NEUTROPHILS # BLD AUTO: 2.15 X10 (3) UL (ref 1.5–7.7)
NEUTROPHILS # BLD AUTO: 2.15 X10(3) UL (ref 1.5–7.7)
NEUTROPHILS NFR BLD AUTO: 50.6 %
OSMOLALITY SERPL CALC.SUM OF ELEC: 289 MOSM/KG (ref 275–295)
PLATELET # BLD AUTO: 256 10(3)UL (ref 150–450)
POTASSIUM SERPL-SCNC: 3.7 MMOL/L (ref 3.5–5.1)
PROT SERPL-MCNC: 7 G/DL (ref 6.4–8.2)
RBC # BLD AUTO: 4.5 X10(6)UL
SODIUM SERPL-SCNC: 140 MMOL/L (ref 136–145)
WBC # BLD AUTO: 4.2 X10(3) UL (ref 4–11)

## 2022-07-08 PROCEDURE — 83540 ASSAY OF IRON: CPT | Performed by: INTERNAL MEDICINE

## 2022-07-08 PROCEDURE — 84702 CHORIONIC GONADOTROPIN TEST: CPT | Performed by: INTERNAL MEDICINE

## 2022-07-08 PROCEDURE — 82728 ASSAY OF FERRITIN: CPT | Performed by: INTERNAL MEDICINE

## 2022-07-08 PROCEDURE — 80053 COMPREHEN METABOLIC PANEL: CPT

## 2022-07-08 PROCEDURE — 85025 COMPLETE CBC W/AUTO DIFF WBC: CPT

## 2022-07-08 PROCEDURE — 83550 IRON BINDING TEST: CPT | Performed by: INTERNAL MEDICINE

## 2022-07-08 PROCEDURE — 36415 COLL VENOUS BLD VENIPUNCTURE: CPT

## 2022-07-15 NOTE — PROGRESS NOTES
D/w [facility] patient should start taking iron tablets and repeat in 3 MO-- kidney levels are abnormal but stable. Called and notified patient. Patient informed and verbalized understanding.

## 2022-08-17 ENCOUNTER — HOSPITAL ENCOUNTER (EMERGENCY)
Facility: HOSPITAL | Age: 52
Discharge: HOME | End: 2022-08-17
Attending: EMERGENCY MEDICINE
Payer: MEDICAID

## 2022-08-17 VITALS
OXYGEN SATURATION: 100 % | HEIGHT: 65 IN | TEMPERATURE: 99 F | HEART RATE: 60 BPM | RESPIRATION RATE: 17 BRPM | DIASTOLIC BLOOD PRESSURE: 77 MMHG | BODY MASS INDEX: 34.82 KG/M2 | WEIGHT: 209 LBS | SYSTOLIC BLOOD PRESSURE: 129 MMHG

## 2022-08-17 DIAGNOSIS — R42 DIZZINESS: Primary | ICD-10-CM

## 2022-08-17 LAB
ALBUMIN SERPL-MCNC: 3.5 G/DL (ref 3.4–5)
ALBUMIN/GLOB SERPL: 0.9 {RATIO} (ref 1–2)
ALP LIVER SERPL-CCNC: 74 U/L
ALT SERPL-CCNC: 13 U/L
ANION GAP SERPL CALC-SCNC: 2 MMOL/L (ref 0–18)
AST SERPL-CCNC: 11 U/L (ref 15–37)
BASOPHILS # BLD AUTO: 0.01 X10(3) UL (ref 0–0.2)
BASOPHILS NFR BLD AUTO: 0.2 %
BILIRUB SERPL-MCNC: 0.5 MG/DL (ref 0.1–2)
BUN BLD-MCNC: 11 MG/DL (ref 7–18)
CALCIUM BLD-MCNC: 8.9 MG/DL (ref 8.5–10.1)
CHLORIDE SERPL-SCNC: 109 MMOL/L (ref 98–112)
CO2 SERPL-SCNC: 27 MMOL/L (ref 21–32)
CREAT BLD-MCNC: 0.99 MG/DL
EOSINOPHIL # BLD AUTO: 0.19 X10(3) UL (ref 0–0.7)
EOSINOPHIL NFR BLD AUTO: 3 %
ERYTHROCYTE [DISTWIDTH] IN BLOOD BY AUTOMATED COUNT: 15.3 %
GFR SERPLBLD BASED ON 1.73 SQ M-ARVRAT: 69 ML/MIN/1.73M2 (ref 60–?)
GLOBULIN PLAS-MCNC: 3.8 G/DL (ref 2.8–4.4)
GLUCOSE BLD-MCNC: 90 MG/DL (ref 70–99)
HCT VFR BLD AUTO: 37.6 %
HGB BLD-MCNC: 11.7 G/DL
IMM GRANULOCYTES # BLD AUTO: 0.01 X10(3) UL (ref 0–1)
IMM GRANULOCYTES NFR BLD: 0.2 %
LYMPHOCYTES # BLD AUTO: 2.73 X10(3) UL (ref 1–4)
LYMPHOCYTES NFR BLD AUTO: 43.8 %
MCH RBC QN AUTO: 24.4 PG (ref 26–34)
MCHC RBC AUTO-ENTMCNC: 31.1 G/DL (ref 31–37)
MCV RBC AUTO: 78.3 FL
MONOCYTES # BLD AUTO: 0.4 X10(3) UL (ref 0.1–1)
MONOCYTES NFR BLD AUTO: 6.4 %
NEUTROPHILS # BLD AUTO: 2.9 X10 (3) UL (ref 1.5–7.7)
NEUTROPHILS # BLD AUTO: 2.9 X10(3) UL (ref 1.5–7.7)
NEUTROPHILS NFR BLD AUTO: 46.4 %
OSMOLALITY SERPL CALC.SUM OF ELEC: 285 MOSM/KG (ref 275–295)
PLATELET # BLD AUTO: 267 10(3)UL (ref 150–450)
POTASSIUM SERPL-SCNC: 3.5 MMOL/L (ref 3.5–5.1)
PROT SERPL-MCNC: 7.3 G/DL (ref 6.4–8.2)
RBC # BLD AUTO: 4.8 X10(6)UL
SODIUM SERPL-SCNC: 138 MMOL/L (ref 136–145)
WBC # BLD AUTO: 6.2 X10(3) UL (ref 4–11)

## 2022-08-17 PROCEDURE — 96360 HYDRATION IV INFUSION INIT: CPT

## 2022-08-17 PROCEDURE — 93010 ELECTROCARDIOGRAM REPORT: CPT

## 2022-08-17 PROCEDURE — 85025 COMPLETE CBC W/AUTO DIFF WBC: CPT | Performed by: EMERGENCY MEDICINE

## 2022-08-17 PROCEDURE — 99284 EMERGENCY DEPT VISIT MOD MDM: CPT

## 2022-08-17 PROCEDURE — 93005 ELECTROCARDIOGRAM TRACING: CPT

## 2022-08-17 PROCEDURE — 96361 HYDRATE IV INFUSION ADD-ON: CPT

## 2022-08-17 PROCEDURE — 80053 COMPREHEN METABOLIC PANEL: CPT | Performed by: EMERGENCY MEDICINE

## 2022-08-17 RX ORDER — MECLIZINE HYDROCHLORIDE 25 MG/1
25 TABLET ORAL 3 TIMES DAILY PRN
Qty: 15 TABLET | Refills: 0 | Status: SHIPPED | OUTPATIENT
Start: 2022-08-17

## 2022-08-17 RX ORDER — MECLIZINE HYDROCHLORIDE 25 MG/1
50 TABLET ORAL ONCE
Status: COMPLETED | OUTPATIENT
Start: 2022-08-17 | End: 2022-08-17

## 2022-08-17 NOTE — ED INITIAL ASSESSMENT (HPI)
Pt states feeling dizziness and light headed started yesterday. Pt states she woke today and symptoms continued. Pt states feeling ok when not moving.

## 2022-08-18 LAB
ATRIAL RATE: 77 BPM
P AXIS: 64 DEGREES
P-R INTERVAL: 180 MS
Q-T INTERVAL: 350 MS
QRS DURATION: 68 MS
QTC CALCULATION (BEZET): 396 MS
R AXIS: 22 DEGREES
T AXIS: 4 DEGREES
VENTRICULAR RATE: 77 BPM

## 2022-11-01 PROBLEM — E53.8 B12 DEFICIENCY: Status: ACTIVE | Noted: 2022-11-01

## 2023-12-05 ENCOUNTER — HOSPITAL ENCOUNTER (EMERGENCY)
Facility: HOSPITAL | Age: 53
Discharge: LEFT BEFORE DISCHARGE | End: 2023-12-05
Payer: MEDICAID

## 2023-12-05 VITALS
DIASTOLIC BLOOD PRESSURE: 83 MMHG | TEMPERATURE: 100 F | BODY MASS INDEX: 33.32 KG/M2 | HEIGHT: 65 IN | HEART RATE: 86 BPM | WEIGHT: 200 LBS | RESPIRATION RATE: 18 BRPM | SYSTOLIC BLOOD PRESSURE: 120 MMHG | OXYGEN SATURATION: 95 %

## 2023-12-05 LAB
FLUAV + FLUBV RNA SPEC NAA+PROBE: NEGATIVE
FLUAV + FLUBV RNA SPEC NAA+PROBE: NEGATIVE
RSV RNA SPEC NAA+PROBE: NEGATIVE
SARS-COV-2 RNA RESP QL NAA+PROBE: DETECTED

## 2023-12-05 PROCEDURE — 0241U SARS-COV-2/FLU A AND B/RSV BY PCR (GENEXPERT): CPT

## 2023-12-05 NOTE — ED INITIAL ASSESSMENT (HPI)
To the ED with c.o cough, sore throat, headache and back pain since Sunday. Fevers since Sunday. Last took Tylenol at 2p. + nasal congestion. Pt is awake, alert, breathing non labored and with ease. States back pain is from picking her mother who fell up from the floor.

## 2023-12-12 ENCOUNTER — LAB ENCOUNTER (OUTPATIENT)
Dept: LAB | Facility: HOSPITAL | Age: 53
End: 2023-12-12
Attending: INTERNAL MEDICINE
Payer: MEDICAID

## 2023-12-12 DIAGNOSIS — E78.2 MIXED HYPERLIPIDEMIA: ICD-10-CM

## 2023-12-12 DIAGNOSIS — D64.9 ANEMIA, UNSPECIFIED TYPE: ICD-10-CM

## 2023-12-12 DIAGNOSIS — E03.9 PRIMARY HYPOTHYROIDISM: ICD-10-CM

## 2023-12-12 DIAGNOSIS — E55.9 VITAMIN D DEFICIENCY: ICD-10-CM

## 2023-12-12 DIAGNOSIS — R73.9 HYPERGLYCEMIA: ICD-10-CM

## 2023-12-12 LAB
ALBUMIN SERPL-MCNC: 3.6 G/DL (ref 3.4–5)
ALBUMIN/GLOB SERPL: 1 {RATIO} (ref 1–2)
ALP LIVER SERPL-CCNC: 69 U/L
ALT SERPL-CCNC: 9 U/L
ANION GAP SERPL CALC-SCNC: 4 MMOL/L (ref 0–18)
AST SERPL-CCNC: 9 U/L (ref 15–37)
BASOPHILS # BLD AUTO: 0.02 X10(3) UL (ref 0–0.2)
BASOPHILS NFR BLD AUTO: 0.5 %
BILIRUB SERPL-MCNC: 0.5 MG/DL (ref 0.1–2)
BUN BLD-MCNC: 13 MG/DL (ref 9–23)
CALCIUM BLD-MCNC: 8.6 MG/DL (ref 8.5–10.1)
CHLORIDE SERPL-SCNC: 107 MMOL/L (ref 98–112)
CHOLEST SERPL-MCNC: 171 MG/DL (ref ?–200)
CO2 SERPL-SCNC: 29 MMOL/L (ref 21–32)
CREAT BLD-MCNC: 1.12 MG/DL
EGFRCR SERPLBLD CKD-EPI 2021: 58 ML/MIN/1.73M2 (ref 60–?)
EOSINOPHIL # BLD AUTO: 0.14 X10(3) UL (ref 0–0.7)
EOSINOPHIL NFR BLD AUTO: 3.2 %
ERYTHROCYTE [DISTWIDTH] IN BLOOD BY AUTOMATED COUNT: 14.9 %
EST. AVERAGE GLUCOSE BLD GHB EST-MCNC: 126 MG/DL (ref 68–126)
FASTING PATIENT LIPID ANSWER: YES
FASTING STATUS PATIENT QL REPORTED: YES
GLOBULIN PLAS-MCNC: 3.7 G/DL (ref 2.8–4.4)
GLUCOSE BLD-MCNC: 87 MG/DL (ref 70–99)
HBA1C MFR BLD: 6 % (ref ?–5.7)
HCT VFR BLD AUTO: 37.5 %
HDLC SERPL-MCNC: 60 MG/DL (ref 40–59)
HGB BLD-MCNC: 11.8 G/DL
IMM GRANULOCYTES # BLD AUTO: 0.02 X10(3) UL (ref 0–1)
IMM GRANULOCYTES NFR BLD: 0.5 %
LDLC SERPL CALC-MCNC: 98 MG/DL (ref ?–100)
LYMPHOCYTES # BLD AUTO: 2 X10(3) UL (ref 1–4)
LYMPHOCYTES NFR BLD AUTO: 45.2 %
MCH RBC QN AUTO: 24.3 PG (ref 26–34)
MCHC RBC AUTO-ENTMCNC: 31.5 G/DL (ref 31–37)
MCV RBC AUTO: 77.2 FL
MONOCYTES # BLD AUTO: 0.27 X10(3) UL (ref 0.1–1)
MONOCYTES NFR BLD AUTO: 6.1 %
NEUTROPHILS # BLD AUTO: 1.97 X10 (3) UL (ref 1.5–7.7)
NEUTROPHILS # BLD AUTO: 1.97 X10(3) UL (ref 1.5–7.7)
NEUTROPHILS NFR BLD AUTO: 44.5 %
NONHDLC SERPL-MCNC: 111 MG/DL (ref ?–130)
OSMOLALITY SERPL CALC.SUM OF ELEC: 289 MOSM/KG (ref 275–295)
PLATELET # BLD AUTO: 334 10(3)UL (ref 150–450)
POTASSIUM SERPL-SCNC: 3.7 MMOL/L (ref 3.5–5.1)
PROT SERPL-MCNC: 7.3 G/DL (ref 6.4–8.2)
RBC # BLD AUTO: 4.86 X10(6)UL
SODIUM SERPL-SCNC: 140 MMOL/L (ref 136–145)
TRIGL SERPL-MCNC: 70 MG/DL (ref 30–149)
TSI SER-ACNC: 1.06 MIU/ML (ref 0.36–3.74)
VIT D+METAB SERPL-MCNC: 8.3 NG/ML (ref 30–100)
VLDLC SERPL CALC-MCNC: 12 MG/DL (ref 0–30)
WBC # BLD AUTO: 4.4 X10(3) UL (ref 4–11)

## 2023-12-12 PROCEDURE — 83036 HEMOGLOBIN GLYCOSYLATED A1C: CPT

## 2023-12-12 PROCEDURE — 80061 LIPID PANEL: CPT

## 2023-12-12 PROCEDURE — 36415 COLL VENOUS BLD VENIPUNCTURE: CPT

## 2023-12-12 PROCEDURE — 80053 COMPREHEN METABOLIC PANEL: CPT

## 2023-12-12 PROCEDURE — 82306 VITAMIN D 25 HYDROXY: CPT

## 2023-12-12 PROCEDURE — 85025 COMPLETE CBC W/AUTO DIFF WBC: CPT

## 2023-12-12 PROCEDURE — 84443 ASSAY THYROID STIM HORMONE: CPT

## 2023-12-28 PROBLEM — Z00.00 WELL FEMALE EXAM WITHOUT GYNECOLOGICAL EXAM: Status: ACTIVE | Noted: 2022-02-25

## 2024-11-14 ENCOUNTER — APPOINTMENT (OUTPATIENT)
Dept: GENERAL RADIOLOGY | Facility: HOSPITAL | Age: 54
End: 2024-11-14
Attending: EMERGENCY MEDICINE
Payer: MEDICAID

## 2024-11-14 ENCOUNTER — HOSPITAL ENCOUNTER (EMERGENCY)
Facility: HOSPITAL | Age: 54
Discharge: HOME | End: 2024-11-14
Attending: EMERGENCY MEDICINE
Payer: MEDICAID

## 2024-11-14 VITALS
HEIGHT: 65 IN | HEART RATE: 51 BPM | RESPIRATION RATE: 16 BRPM | OXYGEN SATURATION: 100 % | SYSTOLIC BLOOD PRESSURE: 123 MMHG | DIASTOLIC BLOOD PRESSURE: 95 MMHG | WEIGHT: 195 LBS | BODY MASS INDEX: 32.49 KG/M2 | TEMPERATURE: 99 F

## 2024-11-14 DIAGNOSIS — K21.9 GASTROESOPHAGEAL REFLUX DISEASE, UNSPECIFIED WHETHER ESOPHAGITIS PRESENT: ICD-10-CM

## 2024-11-14 DIAGNOSIS — R13.10 DYSPHAGIA, UNSPECIFIED TYPE: Primary | ICD-10-CM

## 2024-11-14 LAB
ALBUMIN SERPL-MCNC: 4.1 G/DL (ref 3.2–4.8)
ALBUMIN/GLOB SERPL: 1.1 {RATIO} (ref 1–2)
ALP LIVER SERPL-CCNC: 72 U/L
ALT SERPL-CCNC: <7 U/L
ANION GAP SERPL CALC-SCNC: 5 MMOL/L (ref 0–18)
AST SERPL-CCNC: 13 U/L (ref ?–34)
BASOPHILS # BLD AUTO: 0.02 X10(3) UL (ref 0–0.2)
BASOPHILS NFR BLD AUTO: 0.3 %
BILIRUB SERPL-MCNC: 0.5 MG/DL (ref 0.3–1.2)
BUN BLD-MCNC: 11 MG/DL (ref 9–23)
CALCIUM BLD-MCNC: 9.2 MG/DL (ref 8.7–10.4)
CHLORIDE SERPL-SCNC: 102 MMOL/L (ref 98–112)
CO2 SERPL-SCNC: 29 MMOL/L (ref 21–32)
CREAT BLD-MCNC: 1.12 MG/DL
EGFRCR SERPLBLD CKD-EPI 2021: 58 ML/MIN/1.73M2 (ref 60–?)
EOSINOPHIL # BLD AUTO: 0.14 X10(3) UL (ref 0–0.7)
EOSINOPHIL NFR BLD AUTO: 2.3 %
ERYTHROCYTE [DISTWIDTH] IN BLOOD BY AUTOMATED COUNT: 14.8 %
GLOBULIN PLAS-MCNC: 3.8 G/DL (ref 2–3.5)
GLUCOSE BLD-MCNC: 89 MG/DL (ref 70–99)
HCT VFR BLD AUTO: 38.4 %
HGB BLD-MCNC: 12 G/DL
IMM GRANULOCYTES # BLD AUTO: 0.01 X10(3) UL (ref 0–1)
IMM GRANULOCYTES NFR BLD: 0.2 %
LYMPHOCYTES # BLD AUTO: 2.97 X10(3) UL (ref 1–4)
LYMPHOCYTES NFR BLD AUTO: 48.1 %
MCH RBC QN AUTO: 24.6 PG (ref 26–34)
MCHC RBC AUTO-ENTMCNC: 31.3 G/DL (ref 31–37)
MCV RBC AUTO: 78.7 FL
MONOCYTES # BLD AUTO: 0.35 X10(3) UL (ref 0.1–1)
MONOCYTES NFR BLD AUTO: 5.7 %
NEUTROPHILS # BLD AUTO: 2.68 X10 (3) UL (ref 1.5–7.7)
NEUTROPHILS # BLD AUTO: 2.68 X10(3) UL (ref 1.5–7.7)
NEUTROPHILS NFR BLD AUTO: 43.4 %
OSMOLALITY SERPL CALC.SUM OF ELEC: 281 MOSM/KG (ref 275–295)
PLATELET # BLD AUTO: 248 10(3)UL (ref 150–450)
POTASSIUM SERPL-SCNC: 3.8 MMOL/L (ref 3.5–5.1)
PROT SERPL-MCNC: 7.9 G/DL (ref 5.7–8.2)
RBC # BLD AUTO: 4.88 X10(6)UL
SODIUM SERPL-SCNC: 136 MMOL/L (ref 136–145)
WBC # BLD AUTO: 6.2 X10(3) UL (ref 4–11)

## 2024-11-14 PROCEDURE — 96361 HYDRATE IV INFUSION ADD-ON: CPT

## 2024-11-14 PROCEDURE — 85025 COMPLETE CBC W/AUTO DIFF WBC: CPT | Performed by: EMERGENCY MEDICINE

## 2024-11-14 PROCEDURE — 93005 ELECTROCARDIOGRAM TRACING: CPT

## 2024-11-14 PROCEDURE — 71045 X-RAY EXAM CHEST 1 VIEW: CPT | Performed by: EMERGENCY MEDICINE

## 2024-11-14 PROCEDURE — 99284 EMERGENCY DEPT VISIT MOD MDM: CPT

## 2024-11-14 PROCEDURE — 85025 COMPLETE CBC W/AUTO DIFF WBC: CPT

## 2024-11-14 PROCEDURE — 96374 THER/PROPH/DIAG INJ IV PUSH: CPT

## 2024-11-14 PROCEDURE — 71046 X-RAY EXAM CHEST 2 VIEWS: CPT | Performed by: EMERGENCY MEDICINE

## 2024-11-14 PROCEDURE — 80053 COMPREHEN METABOLIC PANEL: CPT | Performed by: EMERGENCY MEDICINE

## 2024-11-14 PROCEDURE — 93010 ELECTROCARDIOGRAM REPORT: CPT

## 2024-11-14 PROCEDURE — 80053 COMPREHEN METABOLIC PANEL: CPT

## 2024-11-14 RX ORDER — PANTOPRAZOLE SODIUM 40 MG/1
40 TABLET, DELAYED RELEASE ORAL DAILY
Qty: 30 TABLET | Refills: 0 | Status: SHIPPED | OUTPATIENT
Start: 2024-11-14 | End: 2024-12-14

## 2024-11-14 NOTE — ED INITIAL ASSESSMENT (HPI)
Pt arrived to ED w/ her daughter after having weakness, vomiting, shortness of breath after walking short distances, and feeling like food is stuck in her throat. Last vomiting was 4pm today. Denies fevers or diarrhea.

## 2024-11-15 LAB
ATRIAL RATE: 55 BPM
P AXIS: 41 DEGREES
P-R INTERVAL: 164 MS
Q-T INTERVAL: 444 MS
QRS DURATION: 74 MS
QTC CALCULATION (BEZET): 424 MS
R AXIS: 2 DEGREES
T AXIS: 2 DEGREES
VENTRICULAR RATE: 55 BPM

## 2024-11-15 NOTE — ED PROVIDER NOTES
Patient Seen in: [facility] Emergency Department      History     Chief Complaint   Patient presents with    Throat Problem    Shortness Of Breath     Stated Complaint: sob, food keeps getting stuck in throat, hx of acid reflux    Subjective:   HPI      Patient presents with difficulty swallowing.  The patient states that many years ago she did have an ulcer in her stomach.  She does get GERD symptoms periodically and just manages it with over-the-counter antiacids.  She has started to have symptoms again and has been taking chewable antacids as needed.  She states that over the course of the week she has had difficulty swallowing food.  She feels like when she tries to swallow it gets stuck in her throat.  She has to vomit up the small pieces.  She does not have any difficulty swallowing liquids.  She does not have any pain with swallowing.  She has tried to make sure she chews her food adequately but it continues to happen.  She feels like she is somewhat weak.  She feels like when she walks she is very tired and her heart pounds.  She denies abdominal pain or chest pain.    Objective:     Past Medical History:    Diarrhea, unspecified    GERD (gastroesophageal reflux disease)    Heartburn              Past Surgical History:   Procedure Laterality Date          Needle biopsy right      benign                Social History     Socioeconomic History    Marital status: Single   Tobacco Use    Smoking status: Never    Smokeless tobacco: Never   Vaping Use    Vaping status: Never Used   Substance and Sexual Activity    Alcohol use: Never    Drug use: Never                  Physical Exam     ED Triage Vitals [24 1756]   /90   Pulse 71   Resp 18   Temp 98.5 °F (36.9 °C)   Temp src Oral   SpO2 100 %   O2 Device None (Room air)       Current Vitals:   Vital Signs  BP: (!) 123/95  Pulse: 51  Resp: 16  Temp: 98.5 °F (36.9 °C)  Temp src: Oral  MAP (mmHg): (!) 102    Oxygen Therapy  SpO2: 100  %  O2 Device: None (Room air)        Physical Exam  General: Alert and oriented x3 in no acute distress.  HEENT: Normocephalic, atraumatic, pupils equal round and reactive to light, oropharynx clear, uvula midline.  Neck: Supple.  Cardiovascular: Regular rate and rhythm.  Respiratory: Lungs clear to auscultation.  Abdomen: Soft, nontender, no rebound or guarding, normal active bowel sounds.  Extremities: No CCE.  Skin: Warm and dry.    ED Course     Labs Reviewed   CBC WITH DIFFERENTIAL WITH PLATELET - Abnormal; Notable for the following components:       Result Value    MCV 78.7 (*)     MCH 24.6 (*)     All other components within normal limits   COMP METABOLIC PANEL (14) - Abnormal; Notable for the following components:    Creatinine 1.12 (*)     eGFR-Cr 58 (*)     ALT <7 (*)     Globulin  3.8 (*)     All other components within normal limits   RAINBOW DRAW LAVENDER   RAINBOW DRAW LIGHT GREEN   RAINBOW DRAW BLUE     EKG    Rate, intervals and axes as noted on EKG Report.  Rate: 55  Rhythm: Sinus bradycardia  Reading: No acute ischemic change compared to previous         I personally reviewed the chest films and no infiltrate seen on PA and lateral noted.       XR CHEST PA + LAT CHEST (CPT=71046)    Result Date: 11/14/2024  PROCEDURE:  XR CHEST PA + LAT CHEST (CPT=71046)  INDICATIONS:  abnl pCXR  COMPARISON:  [facility] , XR, XR CHEST AP PORTABLE  (CPT=71045), 11/14/2024, 7:04 PM.  [facility] , XR, XR CHEST AP PORTABLE  (CPT=71045), 7/25/2021, 11:19 AM.  TECHNIQUE:  PA and lateral chest radiographs were obtained.  PATIENT STATED HISTORY: (As transcribed by Technologist)  Posrtable chest shows likely a hiatal hernia. Patient states she is aware and has had it for years.    FINDINGS:  Cardiac size and pulmonary vasculature are within normal limits. No pleural effusions. No pneumothorax.  Hiatal hernia.  Minimal left basilar atelectasis.            CONCLUSION:  1. Minimal left basilar atelectasis. 2. Hiatal hernia.     LOCATION:  [id number]   Dictated by (CST): Kwabena Prado MD on 11/14/2024 at 8:20 PM     Finalized by (CST): Kwabena Prado MD on 11/14/2024 at 8:20 PM       XR CHEST AP PORTABLE  (CPT=71045)    Result Date: 11/14/2024  PROCEDURE:  XR CHEST AP PORTABLE  (CPT=71045)  TECHNIQUE:  AP chest radiograph was obtained.  COMPARISON:  [facility] , XR, XR CHEST PA + LAT CHEST (BRE=70603), 7/02/2013, 11:51 AM.  [facility] , XR, CHEST PA   LATERAL, 1/09/2011, 9:59 AM.  [facility] , XR, XR CHEST AP PORTABLE  (CPT=71010), 9/24/2015, 11:14 AM.  [facility] , XR, XR CHEST AP PORTABLE  (CPT=71045), 4/04/2020, 12:13 PM.  [facility] , XR, XR CHEST AP PORTABLE  (CPT=71045), 7/25/2021, 11:19 AM.  INDICATIONS:  sob, food keeps getting stuck in throat, hx of acid reflux  PATIENT STATED HISTORY: (As transcribed by Technologist)  Patient states having difficulty with swallowing solid foods. Patient feels like food get stucks in her throat and she has to spit/vomit it up. Denies any chest complaints. Hx GERD    FINDINGS:  Normal heart size and pulmonary vascularity.  Mildly tortuous aorta.  Rounded retrocardiac opacity is suggestive of hiatal hernia, left lower lobe pneumonia is considered less likely.            CONCLUSION:  Probable moderate hiatal hernia, less likely left lower lobe pneumonia.   LOCATION:  [facility]      Dictated by (CST): Jose Mukherjee MD on 11/14/2024 at 7:40 PM     Finalized by (CST): Jose Mukherjee MD on 11/14/2024 at 7:41 PM        Medications   sodium chloride 0.9 % IV bolus 1,000 mL (0 mL Intravenous Stopped 11/14/24 2039)   pantoprazole (Protonix) 40 mg in sodium chloride 0.9% PF 10 mL IV push (40 mg Intravenous Given 11/14/24 1939)     Patient was given Protonix and IV fluids.     MDM      Patient presents with difficulty swallowing and vomiting.  Differential diagnosis includes but is not limited to esophagitis, esophageal stricture and aspiration pneumonia.  The patient overall appears well.  She does not have evidence of pneumonia on  x-ray and has normal saturations.  She has a history of GERD and I suspect that this is an esophageal problem.  She is not having any respiratory symptoms.  She will be continued on Protonix and was counseled to maintain only a very soft diet and liquid diet at this time.  She will be given follow-up with Dr. Vásquez from gastroenterology.  She was counseled regarding symptoms to return for.  She is comfortable with the plan.        Medical Decision Making      Disposition and Plan     Clinical Impression:  1. Dysphagia, unspecified type    2. Gastroesophageal reflux disease, unspecified whether esophagitis present         Disposition:  Discharge  11/14/2024  8:39 pm    Follow-up:  Sim Vásquez MD  [address] [place] [address] [phone]    Call in 1 day(s)            Medications Prescribed:  Discharge Medication List as of 11/14/2024  8:47 PM        START taking these medications    Details   pantoprazole 40 MG Oral Tab EC Take 1 tablet (40 mg total) by mouth daily., Normal, Disp-30 tablet, R-0                 Supplementary Documentation:

## 2024-11-18 ENCOUNTER — HOSPITAL ENCOUNTER (OUTPATIENT)
Dept: MAMMOGRAPHY | Facility: HOSPITAL | Age: 54
Discharge: HOME | End: 2024-11-18
Attending: INTERNAL MEDICINE
Payer: MEDICAID

## 2024-11-18 DIAGNOSIS — Z12.31 VISIT FOR SCREENING MAMMOGRAM: ICD-10-CM

## 2024-11-18 PROCEDURE — 77067 SCR MAMMO BI INCL CAD: CPT | Performed by: INTERNAL MEDICINE

## 2024-11-18 PROCEDURE — 77063 BREAST TOMOSYNTHESIS BI: CPT | Performed by: INTERNAL MEDICINE

## 2025-02-20 ENCOUNTER — APPOINTMENT (OUTPATIENT)
Dept: GENERAL RADIOLOGY | Facility: HOSPITAL | Age: 55
End: 2025-02-20
Attending: EMERGENCY MEDICINE
Payer: MEDICAID

## 2025-02-20 ENCOUNTER — HOSPITAL ENCOUNTER (EMERGENCY)
Facility: HOSPITAL | Age: 55
Discharge: HOME | End: 2025-02-20
Attending: EMERGENCY MEDICINE
Payer: MEDICAID

## 2025-02-20 ENCOUNTER — LAB ENCOUNTER (OUTPATIENT)
Dept: LAB | Facility: HOSPITAL | Age: 55
End: 2025-02-20
Attending: INTERNAL MEDICINE
Payer: MEDICAID

## 2025-02-20 VITALS
DIASTOLIC BLOOD PRESSURE: 73 MMHG | OXYGEN SATURATION: 98 % | BODY MASS INDEX: 31.65 KG/M2 | TEMPERATURE: 98 F | HEIGHT: 65 IN | WEIGHT: 190 LBS | HEART RATE: 61 BPM | SYSTOLIC BLOOD PRESSURE: 121 MMHG | RESPIRATION RATE: 18 BRPM

## 2025-02-20 DIAGNOSIS — E55.9 VITAMIN D DEFICIENCY: ICD-10-CM

## 2025-02-20 DIAGNOSIS — R73.9 BLOOD GLUCOSE ELEVATED: ICD-10-CM

## 2025-02-20 DIAGNOSIS — E78.5 HYPERLIPIDEMIA, UNSPECIFIED HYPERLIPIDEMIA TYPE: ICD-10-CM

## 2025-02-20 DIAGNOSIS — S63.633A SPRAIN OF INTERPHALANGEAL JOINT OF LEFT MIDDLE FINGER, INITIAL ENCOUNTER: Primary | ICD-10-CM

## 2025-02-20 DIAGNOSIS — E03.9 PRIMARY HYPOTHYROIDISM: ICD-10-CM

## 2025-02-20 DIAGNOSIS — D64.9 ANEMIA, UNSPECIFIED TYPE: ICD-10-CM

## 2025-02-20 LAB
ALBUMIN SERPL-MCNC: 4.3 G/DL (ref 3.2–4.8)
ALBUMIN/GLOB SERPL: 1.4 {RATIO} (ref 1–2)
ALP LIVER SERPL-CCNC: 74 U/L
ALT SERPL-CCNC: <7 U/L
ANION GAP SERPL CALC-SCNC: 12 MMOL/L (ref 0–18)
AST SERPL-CCNC: 13 U/L (ref ?–34)
BASOPHILS # BLD AUTO: 0.02 X10(3) UL (ref 0–0.2)
BASOPHILS NFR BLD AUTO: 0.5 %
BILIRUB SERPL-MCNC: 0.7 MG/DL (ref 0.3–1.2)
BUN BLD-MCNC: 11 MG/DL (ref 9–23)
CALCIUM BLD-MCNC: 9.6 MG/DL (ref 8.7–10.6)
CHLORIDE SERPL-SCNC: 103 MMOL/L (ref 98–112)
CHOLEST SERPL-MCNC: 238 MG/DL (ref ?–200)
CO2 SERPL-SCNC: 26 MMOL/L (ref 21–32)
CREAT BLD-MCNC: 1.15 MG/DL
EGFRCR SERPLBLD CKD-EPI 2021: 56 ML/MIN/1.73M2 (ref 60–?)
EOSINOPHIL # BLD AUTO: 0.12 X10(3) UL (ref 0–0.7)
EOSINOPHIL NFR BLD AUTO: 3.1 %
ERYTHROCYTE [DISTWIDTH] IN BLOOD BY AUTOMATED COUNT: 15.1 %
EST. AVERAGE GLUCOSE BLD GHB EST-MCNC: 128 MG/DL (ref 68–126)
FASTING PATIENT LIPID ANSWER: YES
FASTING STATUS PATIENT QL REPORTED: YES
GLOBULIN PLAS-MCNC: 3.1 G/DL (ref 2–3.5)
GLUCOSE BLD-MCNC: 83 MG/DL (ref 70–99)
HBA1C MFR BLD: 6.1 % (ref ?–5.7)
HCT VFR BLD AUTO: 36.4 %
HDLC SERPL-MCNC: 72 MG/DL (ref 40–59)
HGB BLD-MCNC: 11.4 G/DL
IMM GRANULOCYTES # BLD AUTO: 0.01 X10(3) UL (ref 0–1)
IMM GRANULOCYTES NFR BLD: 0.3 %
LDLC SERPL CALC-MCNC: 154 MG/DL (ref ?–100)
LYMPHOCYTES # BLD AUTO: 1.74 X10(3) UL (ref 1–4)
LYMPHOCYTES NFR BLD AUTO: 45.1 %
MCH RBC QN AUTO: 25 PG (ref 26–34)
MCHC RBC AUTO-ENTMCNC: 31.3 G/DL (ref 31–37)
MCV RBC AUTO: 79.8 FL
MONOCYTES # BLD AUTO: 0.31 X10(3) UL (ref 0.1–1)
MONOCYTES NFR BLD AUTO: 8 %
NEUTROPHILS # BLD AUTO: 1.66 X10 (3) UL (ref 1.5–7.7)
NEUTROPHILS # BLD AUTO: 1.66 X10(3) UL (ref 1.5–7.7)
NEUTROPHILS NFR BLD AUTO: 43 %
NONHDLC SERPL-MCNC: 166 MG/DL (ref ?–130)
OSMOLALITY SERPL CALC.SUM OF ELEC: 291 MOSM/KG (ref 275–295)
PLATELET # BLD AUTO: 242 10(3)UL (ref 150–450)
POTASSIUM SERPL-SCNC: 3.7 MMOL/L (ref 3.5–5.1)
PROT SERPL-MCNC: 7.4 G/DL (ref 5.7–8.2)
RBC # BLD AUTO: 4.56 X10(6)UL
SODIUM SERPL-SCNC: 141 MMOL/L (ref 136–145)
TRIGL SERPL-MCNC: 70 MG/DL (ref 30–149)
TSI SER-ACNC: 1.44 UIU/ML (ref 0.55–4.78)
VIT D+METAB SERPL-MCNC: 21.8 NG/ML (ref 30–100)
VLDLC SERPL CALC-MCNC: 13 MG/DL (ref 0–30)
WBC # BLD AUTO: 3.9 X10(3) UL (ref 4–11)

## 2025-02-20 PROCEDURE — 84443 ASSAY THYROID STIM HORMONE: CPT

## 2025-02-20 PROCEDURE — 85025 COMPLETE CBC W/AUTO DIFF WBC: CPT

## 2025-02-20 PROCEDURE — 36415 COLL VENOUS BLD VENIPUNCTURE: CPT

## 2025-02-20 PROCEDURE — 99284 EMERGENCY DEPT VISIT MOD MDM: CPT

## 2025-02-20 PROCEDURE — 83036 HEMOGLOBIN GLYCOSYLATED A1C: CPT

## 2025-02-20 PROCEDURE — 80053 COMPREHEN METABOLIC PANEL: CPT

## 2025-02-20 PROCEDURE — 73130 X-RAY EXAM OF HAND: CPT | Performed by: EMERGENCY MEDICINE

## 2025-02-20 PROCEDURE — 80061 LIPID PANEL: CPT

## 2025-02-20 PROCEDURE — 99283 EMERGENCY DEPT VISIT LOW MDM: CPT

## 2025-02-20 PROCEDURE — 82306 VITAMIN D 25 HYDROXY: CPT

## 2025-02-20 RX ORDER — IBUPROFEN 600 MG/1
600 TABLET, FILM COATED ORAL EVERY 8 HOURS PRN
Qty: 20 TABLET | Refills: 0 | Status: SHIPPED | OUTPATIENT
Start: 2025-02-20 | End: 2025-02-27

## 2025-02-20 NOTE — ED INITIAL ASSESSMENT (HPI)
Fell a few weeks ago. Left third finger injury at that time.  This am woke up with finger stuck in flexed position.

## 2025-02-20 NOTE — ED PROVIDER NOTES
Patient Seen in: [facility] Emergency Department      History     Chief Complaint   Patient presents with    Arm or Hand Injury     Left hand injury      Stated Complaint: left hand injury    Subjective:   HPI      Patient comes to the emergency department with left middle finger discomfort.  She states that she injured her left middle finger in a fall few weeks ago and since then she has noted ongoing, mild, but persistent pain.  She was concerned today because it was \"stuck\" in a flexed position which she was able to resolve by manually extending it with her other hand.  She states that this has happened on several occasions over the past few weeks.  She has not reinjured it.  Her pain level at this time is mild.  She has not noted any deformity.    Objective:     Past Medical History:    Diarrhea, unspecified    GERD (gastroesophageal reflux disease)    Heartburn              Past Surgical History:   Procedure Laterality Date          Needle biopsy right      benign                Social History     Socioeconomic History    Marital status: Single   Tobacco Use    Smoking status: Never    Smokeless tobacco: Never   Vaping Use    Vaping status: Never Used   Substance and Sexual Activity    Alcohol use: Never    Drug use: Never                  Physical Exam     ED Triage Vitals   BP 25 0845 127/76   Pulse 25 0845 60   Resp 25 0845 18   Temp 25 0845 97.8 °F (36.6 °C)   Temp src 25 0845 Temporal   SpO2 25 0845 98 %   O2 Device 25 1012 None (Room air)       Current Vitals:   Vital Signs  BP: 121/73  Pulse: 61  Resp: 18  Temp: 97.8 °F (36.6 °C)  Temp src: Temporal    Oxygen Therapy  SpO2: 98 %  O2 Device: None (Room air)        Physical Exam  Vitals and nursing note reviewed.   Constitutional:       General: She is not in acute distress.     Appearance: Normal appearance. She is well-developed.   Musculoskeletal:      Comments: Mild discomfort noted in the  left middle finger at the PIP joint.  The patient has no deformity.  Range of motion is intact at the MCP PIP and DIP joints.  Distal sensation and capillary refill are normal.  The patient has no other digital or hand discomfort.  No edema, erythema or tenderness noted, particularly over the flexor surface overlying the flexor tendon.   Neurological:      Mental Status: She is alert and oriented to person, place, and time.            ED Course   Labs Reviewed - No data to display    ED Course as of 02/20/25 1048  ------------------------------------------------------------  Time: 02/20 1009  Value: XR HAND (MIN 3 VIEWS), LEFT (CPT=73130)  Comment: Images were independently viewed by me and no fracture or dislocation was noted.                MDM      Patient comes to the Emergency Department with left middle finger discomfort and \"sticking\" of the finger in a flexed position.  Differential diagnose includes fracture, dislocation and tenosynovitis.  However, the patient's x-rays did not reveal any acute osseous abnormalities and patient's clinical exam did not suggest any acute tendinous pathology that required immediate intervention.  Because of the patient's symptoms, I did recommend that she follow-up with a hand specialist.  She was given contact information for that follow-up.  She was instructed use ibuprofen for discomfort.  She did request prescription for ibuprofen which was ordered.        Medical Decision Making      Disposition and Plan     Clinical Impression:  1. Sprain of interphalangeal joint of left middle finger, initial encounter         Disposition:  Discharge  2/20/2025 10:11 am    Follow-up:  [facility], [address] [place] [id number] [phone]  Follow up            Medications Prescribed:  Discharge Medication List as of 2/20/2025 10:12 AM        START taking these medications    Details   !! ibuprofen 600 MG Oral Tab Take  1 tablet (600 mg total) by mouth every 8 (eight) hours as needed for Pain or Fever., Normal, Disp-20 tablet, R-0       !! - Potential duplicate medications found. Please discuss with provider.              Supplementary Documentation:

## 2025-02-24 PROBLEM — Z01.419 WELL FEMALE EXAM WITH ROUTINE GYNECOLOGICAL EXAM: Status: ACTIVE | Noted: 2022-02-25

## (undated) NOTE — ED AVS SNAPSHOT
[facility] Emergency Department    [facility]  [address] [address]    Phone:  [phone]    Fax:  Gary Roblero Dear   MRN: [id number]    Department:  [facility] Emergency Department   Date of Visit:  6/1/20 IF THERE IS ANY CHANGE OR WORSENING OF YOUR CONDITION, CALL YOUR PRIMARY CARE PHYSICIAN AT ONCE OR RETURN IMMEDIATELY TO THE EMERGENCY DEPARTMENT.     If you have been prescribed any medication(s), please fill your prescription right away and begin taking t

## (undated) NOTE — LETTER
Date: 8/22/2018    Patient Name: Ned Carpenter Dear          To Whom it may concern: This letter has been written at the patient's request. The above patient was seen at the [facility] for treatment of a medical condition.     This patient shoul

## (undated) NOTE — ED AVS SNAPSHOT
Malia Davidson Dear   MRN: [id number]    Department:  [facility] Emergency Department   Date of Visit:  7/9/2018           Disclosure     Insurance plans vary and the physician(s) referred by the ER may not be covered by your plan.  Please contact your i tell this physician (or your personal doctor if your instructions are to return to your personal doctor) about any new or lasting problems. The primary care or specialist physician will see patients referred from the [facility] Emergency Department.  Yossi Bullard

## (undated) NOTE — LETTER
Date & Time: 4/1/2019, 5:38 PM  Patient: Chip Dutton Dear  Encounter Provider(s):    Joshua Velasquez       To Whom It May Concern:    Mar Jace Dear was seen and treated in our department on 4/1/2019. She should not return to school until 4/4/2019.

## (undated) NOTE — ED AVS SNAPSHOT
Mihir Main Dear   MRN: [id number]    Department:  [facility] Emergency Department   Date of Visit:  4/1/2019           Disclosure     Insurance plans vary and the physician(s) referred by the ER may not be covered by your plan.  Please contact your i tell this physician (or your personal doctor if your instructions are to return to your personal doctor) about any new or lasting problems. The primary care or specialist physician will see patients referred from the [facility] Emergency Department.  Salvadore Skiff

## (undated) NOTE — LETTER
June 1, 2017    Patient: Raquel [place] Dear   Date of Visit: 6/1/2017       To Whom It May Concern:    Sanjay Villegas was seen and treated in our emergency department on 6/1/2017. She should not return to school until 06/03/2017.     If you have any questions o

## (undated) NOTE — ED AVS SNAPSHOT
[facility] Emergency Department    [facility]  [address] [address]    Phone:  [phone]    Fax:  Aguila Blount Dear   MRN: [id number]    Department:  [facility] Emergency Department   Date of Visit:  6/1/20 Pediatric [phone] Emergency Department   [phone]       To Check ER Wait Times:  TEXT 'ERwait' to 47304      Click [email or web address]      Or call [phone]    If you have any problems with your follow-up, please call our case [facility] before you leave. After you leave, you should follow the attached instructions. I have read and understand the instructions given to me by my caregivers. [facility]        Pharmacy Address Phone Number   [phone] Final result    Impression:    PROCEDURE:  XR LUMBAR SPINE (MIN 4 VIEWS) (CPT=72110)     TECHNIQUE:  AP, lateral, oblique, and coned down L5-S1 views were obtained. COMPARISON:  None.      INDICATIONS:  slipped and fell down stairs injuried back     PA visit,  view other health information, and more. To sign up or find more information, go to [email or web address] and click on the Sign Up Now link in the [employer] box.      Enter your [employer] Activation Code exactly as it appears below along with yo